# Patient Record
Sex: MALE | Race: WHITE | NOT HISPANIC OR LATINO | Employment: OTHER | URBAN - METROPOLITAN AREA
[De-identification: names, ages, dates, MRNs, and addresses within clinical notes are randomized per-mention and may not be internally consistent; named-entity substitution may affect disease eponyms.]

---

## 2017-02-08 ENCOUNTER — PATIENT MESSAGE (OUTPATIENT)
Dept: INTERNAL MEDICINE | Facility: CLINIC | Age: 57
End: 2017-02-08

## 2017-10-04 DIAGNOSIS — R06.00 DYSPNEA, UNSPECIFIED TYPE: Primary | ICD-10-CM

## 2017-10-18 ENCOUNTER — HOSPITAL ENCOUNTER (OUTPATIENT)
Dept: RADIOLOGY | Facility: HOSPITAL | Age: 57
Discharge: HOME | End: 2017-10-18
Payer: MEDICARE

## 2017-10-18 ENCOUNTER — OFFICE VISIT (OUTPATIENT)
Dept: PULMONOLOGY | Facility: CLINIC | Age: 57
End: 2017-10-18
Payer: COMMERCIAL

## 2017-10-18 ENCOUNTER — CLINICAL SUPPORT (OUTPATIENT)
Dept: INTERNAL MEDICINE | Facility: CLINIC | Age: 57
End: 2017-10-18
Payer: MEDICARE

## 2017-10-18 ENCOUNTER — HOSPITAL ENCOUNTER (OUTPATIENT)
Dept: CARDIOLOGY | Facility: CLINIC | Age: 57
Discharge: HOME | End: 2017-10-18
Payer: MEDICARE

## 2017-10-18 VITALS
HEIGHT: 67 IN | HEART RATE: 44 BPM | SYSTOLIC BLOOD PRESSURE: 98 MMHG | BODY MASS INDEX: 26.94 KG/M2 | DIASTOLIC BLOOD PRESSURE: 60 MMHG | WEIGHT: 171.63 LBS

## 2017-10-18 DIAGNOSIS — Z72.89 OTHER PROBLEMS RELATED TO LIFESTYLE: ICD-10-CM

## 2017-10-18 DIAGNOSIS — E78.5 HYPERLIPIDEMIA, UNSPECIFIED HYPERLIPIDEMIA TYPE: Primary | ICD-10-CM

## 2017-10-18 DIAGNOSIS — E03.9 PRIMARY HYPOTHYROIDISM: ICD-10-CM

## 2017-10-18 DIAGNOSIS — D64.9 ANEMIA, UNSPECIFIED TYPE: ICD-10-CM

## 2017-10-18 DIAGNOSIS — E11.9 DIABETES MELLITUS WITHOUT COMPLICATION: ICD-10-CM

## 2017-10-18 DIAGNOSIS — Z12.5 SPECIAL SCREENING FOR MALIGNANT NEOPLASM OF PROSTATE: ICD-10-CM

## 2017-10-18 DIAGNOSIS — E03.9 MYXEDEMA HEART DISEASE: ICD-10-CM

## 2017-10-18 DIAGNOSIS — I51.9 MYXEDEMA HEART DISEASE: ICD-10-CM

## 2017-10-18 DIAGNOSIS — Z11.4 SCREENING FOR HUMAN IMMUNODEFICIENCY VIRUS: ICD-10-CM

## 2017-10-18 DIAGNOSIS — R06.00 DYSPNEA, UNSPECIFIED TYPE: ICD-10-CM

## 2017-10-18 DIAGNOSIS — Z00.00 ANNUAL PHYSICAL EXAM: Primary | ICD-10-CM

## 2017-10-18 LAB
ALBUMIN SERPL BCP-MCNC: 3.7 G/DL
ALP SERPL-CCNC: 74 U/L
ALT SERPL W/O P-5'-P-CCNC: 34 U/L
ANION GAP SERPL CALC-SCNC: 10 MMOL/L
AST SERPL-CCNC: 44 U/L
BILIRUB SERPL-MCNC: 1.3 MG/DL
BUN SERPL-MCNC: 19 MG/DL
CALCIUM SERPL-MCNC: 9.7 MG/DL
CHLORIDE SERPL-SCNC: 110 MMOL/L
CHOLEST SERPL-MCNC: 154 MG/DL
CHOLEST/HDLC SERPL: 3.1 {RATIO}
CO2 SERPL-SCNC: 28 MMOL/L
COMPLEXED PSA SERPL-MCNC: 0.43 NG/ML
CREAT SERPL-MCNC: 1 MG/DL
ERYTHROCYTE [DISTWIDTH] IN BLOOD BY AUTOMATED COUNT: 13.2 %
EST. GFR  (AFRICAN AMERICAN): >60 ML/MIN/1.73 M^2
EST. GFR  (NON AFRICAN AMERICAN): >60 ML/MIN/1.73 M^2
ESTIMATED AVG GLUCOSE: 120 MG/DL
GLUCOSE SERPL-MCNC: 122 MG/DL
HBA1C MFR BLD HPLC: 5.8 %
HCT VFR BLD AUTO: 42.8 %
HCV AB SERPL QL IA: NEGATIVE
HDLC SERPL-MCNC: 49 MG/DL
HDLC SERPL: 31.8 %
HGB BLD-MCNC: 13.9 G/DL
HIV 1+2 AB+HIV1 P24 AG SERPL QL IA: NEGATIVE
LDLC SERPL CALC-MCNC: 91.8 MG/DL
MCH RBC QN AUTO: 31.1 PG
MCHC RBC AUTO-ENTMCNC: 32.5 G/DL
MCV RBC AUTO: 96 FL
NONHDLC SERPL-MCNC: 105 MG/DL
PLATELET # BLD AUTO: 198 K/UL
PMV BLD AUTO: 10.5 FL
POTASSIUM SERPL-SCNC: 4.8 MMOL/L
PROT SERPL-MCNC: 7.1 G/DL
RBC # BLD AUTO: 4.47 M/UL
SODIUM SERPL-SCNC: 148 MMOL/L
TRIGL SERPL-MCNC: 66 MG/DL
TSH SERPL DL<=0.005 MIU/L-ACNC: 3.68 UIU/ML
WBC # BLD AUTO: 5.26 K/UL

## 2017-10-18 PROCEDURE — 85027 COMPLETE CBC AUTOMATED: CPT

## 2017-10-18 PROCEDURE — 86803 HEPATITIS C AB TEST: CPT

## 2017-10-18 PROCEDURE — 99999 PR PBB SHADOW E&M-EST. PATIENT-LVL III: CPT | Mod: PBBFAC,,, | Performed by: INTERNAL MEDICINE

## 2017-10-18 PROCEDURE — 99396 PREV VISIT EST AGE 40-64: CPT | Mod: S$GLB,,, | Performed by: INTERNAL MEDICINE

## 2017-10-18 PROCEDURE — 71020 XR CHEST PA AND LATERAL: CPT | Mod: 26,,, | Performed by: RADIOLOGY

## 2017-10-18 PROCEDURE — 83036 HEMOGLOBIN GLYCOSYLATED A1C: CPT

## 2017-10-18 PROCEDURE — 80061 LIPID PANEL: CPT

## 2017-10-18 PROCEDURE — 86703 HIV-1/HIV-2 1 RESULT ANTBDY: CPT

## 2017-10-18 PROCEDURE — 84443 ASSAY THYROID STIM HORMONE: CPT

## 2017-10-18 PROCEDURE — 71020 XR CHEST PA AND LATERAL: CPT | Mod: TC

## 2017-10-18 PROCEDURE — 93005 ELECTROCARDIOGRAM TRACING: CPT | Mod: PBBFAC | Performed by: INTERNAL MEDICINE

## 2017-10-18 PROCEDURE — 84153 ASSAY OF PSA TOTAL: CPT

## 2017-10-18 PROCEDURE — 93010 ELECTROCARDIOGRAM REPORT: CPT | Mod: S$PBB,,, | Performed by: INTERNAL MEDICINE

## 2017-10-18 PROCEDURE — 80053 COMPREHEN METABOLIC PANEL: CPT

## 2017-10-18 NOTE — LETTER
October 18, 2017    Yoel Martinez  [address] [facility] [address]             [facility] - Pulmonary Services  [facility]  [place] [address]  Phone: [phone] Dear Mr. Martinez:    Thank you for allowing me to serve you and perform your Executive Health exam on 10/18/2017. This letter will serve as a brief summary of the physical findings and laboratory/studies performed and recommendations at this time. At today's visit, you have a slight elevation of your blood sugar, so I would ask you to restrict your sweet intake. All other parameters are normal.         If you have any questions or concerns, please don't hesitate to call.    Sincerely,        Jay Lee MD

## 2017-10-18 NOTE — PROGRESS NOTES
Subjective:       Patient ID: Yoel Martinez is a 57 y.o. male.    Chief Complaint: Annual Exam    HPI  56 yo retired [employer] employee comes for his periodic health exam. He has very limited vision secondary to Stargardt's Disease, a form of macular degeneration. He told medical prison and has limited vision but has adapters for his glasses that allows him to read and drive in the day time. 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 He has no active medical complaints and is in very good spirits.  He had two cardiac stents place several years ago after a positive stress test. He has no symptoms today. He did a stress EKG last year and achieved 21 METS! And got a Mayorga Score of 11.   Review of Systems   Constitutional: Negative.    HENT: Negative.    Eyes: Negative.         Decreased vision secondary to Stargardt's disease   Respiratory: Negative.         Peak flow 650 l/min   Cardiovascular: Negative.         2  Stents:  November 2014. Asymptomatic now.   Gastrointestinal: Negative.    Genitourinary: Negative.    Musculoskeletal: Negative.    Skin: Negative.    Neurological: Negative.    Psychiatric/Behavioral: Negative.    All other systems reviewed and are negative.      Objective:      Physical Exam    Assessment:       No diagnosis found.    Plan:           Labs: Glucose: 122 and HgB A-1C 5.8--Borderline diabetic, All other parameters are normal. Chest x-ray is clear and EKG: sinus bradycardia, otherwise normal.

## 2018-03-14 DIAGNOSIS — E78.5 HYPERLIPIDEMIA, UNSPECIFIED HYPERLIPIDEMIA TYPE: ICD-10-CM

## 2018-03-14 DIAGNOSIS — Z98.61 S/P PTCA (PERCUTANEOUS TRANSLUMINAL CORONARY ANGIOPLASTY): ICD-10-CM

## 2018-03-14 RX ORDER — ATORVASTATIN CALCIUM 80 MG/1
80 TABLET, FILM COATED ORAL DAILY
Qty: 90 TABLET | Refills: 3 | Status: SHIPPED | OUTPATIENT
Start: 2018-03-14 | End: 2018-12-07 | Stop reason: SDUPTHER

## 2018-05-09 ENCOUNTER — OFFICE VISIT (OUTPATIENT)
Dept: CARDIOLOGY | Facility: CLINIC | Age: 58
End: 2018-05-09
Payer: COMMERCIAL

## 2018-05-09 VITALS
BODY MASS INDEX: 28.2 KG/M2 | HEART RATE: 42 BPM | HEIGHT: 67 IN | WEIGHT: 179.69 LBS | DIASTOLIC BLOOD PRESSURE: 76 MMHG | SYSTOLIC BLOOD PRESSURE: 133 MMHG

## 2018-05-09 DIAGNOSIS — Z98.61 S/P PTCA (PERCUTANEOUS TRANSLUMINAL CORONARY ANGIOPLASTY): Primary | ICD-10-CM

## 2018-05-09 DIAGNOSIS — E78.5 HYPERLIPIDEMIA, UNSPECIFIED HYPERLIPIDEMIA TYPE: ICD-10-CM

## 2018-05-09 DIAGNOSIS — Z82.49 FAMILY HISTORY OF EARLY CAD: ICD-10-CM

## 2018-05-09 PROCEDURE — 99213 OFFICE O/P EST LOW 20 MIN: CPT | Mod: S$GLB,,, | Performed by: INTERNAL MEDICINE

## 2018-05-09 PROCEDURE — 99999 PR PBB SHADOW E&M-EST. PATIENT-LVL III: CPT | Mod: PBBFAC,,, | Performed by: INTERNAL MEDICINE

## 2018-05-09 RX ORDER — EZETIMIBE 10 MG/1
10 TABLET ORAL DAILY
Qty: 90 TABLET | Refills: 3 | Status: SHIPPED | OUTPATIENT
Start: 2018-05-09 | End: 2018-12-07 | Stop reason: SDUPTHER

## 2018-05-09 RX ORDER — GLUCOSAMINE/CHONDRO SU A 500-400 MG
1 TABLET ORAL DAILY
COMMUNITY
End: 2020-01-22

## 2018-05-09 NOTE — Clinical Note
Thank you for referring Yoel Michelle for follow-up of coronary artery disease. Please see my note for details of this encounter. If you have any questions, please contact me.  Thank you again for the referral.

## 2018-05-09 NOTE — PROGRESS NOTES
Chart has been dictated using voice recognition software.  It is not been reviewed carefully for any transcriptional errors due to this technology.   Subjective:   Patient ID:  Yoel Martinez is a 57 y.o. male who presents for follow-up of S/P PTCA (percutaneous transluminal coronary angioplasty) (1 yr f/u )      HPI: Patient with asymptomatic coronary artery disease s/p PCI on 24-Nov-2014 with a design stents placed to proximal LAD and mid RCA presents for reevaluation.  .    Patient denies any chest discomfort on exertion or at rest.  Patient denies any dyspnea at rest or on exertion, orthopnea, PND, or edema.  Patient denies any palpitations, lightheadedness, or syncope.      Past Medical History:   Diagnosis Date    Coronary artery disease     Hyperlipidemia     Macular degeneration     legally blind    Stargardt's disease        Outpatient Medications Prior to Visit   Medication Sig Dispense Refill    aspirin (ECOTRIN) 81 MG EC tablet Take 81 mg by mouth once daily.      atorvastatin (LIPITOR) 80 MG tablet Take 1 tablet (80 mg total) by mouth once daily. 90 tablet 3    cholecalciferol, vitamin D3, (VITAMIN D3) 2,000 unit Cap Take 1 capsule by mouth once daily.      omega-3 fatty acids-vitamin E (FISH OIL) 1,000 mg Cap Take by mouth.      saw palmetto 160 MG capsule Take 450 mg by mouth once daily.       ezetimibe (ZETIA) 10 mg tablet Take 1 tablet (10 mg total) by mouth once daily. 90 tablet 3     No facility-administered medications prior to visit.        ROS - No change from prior visit in neurologic, respiratory, endocrine, GI, hematologic, or constitutional complaints   Objective:   Physical Exam      Lab Results   Component Value Date     (H) 10/18/2017    K 4.8 10/18/2017    BUN 19 10/18/2017    CREATININE 1.0 10/18/2017     (H) 10/18/2017    HGBA1C 5.8 (H) 10/18/2017    CHOL 154 10/18/2017    HDL 49 10/18/2017    LDLCALC 91.8 10/18/2017    TRIG 66 10/18/2017    CHOLHDL 31.8  10/18/2017    HGB 13.9 (L) 10/18/2017    HCT 42.8 10/18/2017     10/18/2017    INR 0.9 11/25/2014       Assessment:     1. S/P PTCA (percutaneous transluminal coronary angioplasty)    2. Hyperlipidemia, unspecified hyperlipidemia type    3. Family history of early CAD      Patient has no symptoms of cardiac ischemia, heart failure, or significant arrhythmias.  The patient needs better control of his lipid profile as his last LDL cholesterol was 90.  The patient has agreed to retry ezetimibe now that it is generic.  Patient will have a lipid profile checked 4 weeks after starting the therapy.  Patient should return as needed, as other than his cholesterol, all of his other parameters are controlled.  Plan:     Yoel was seen today for s/p ptca (percutaneous transluminal coronary angioplasty).    Diagnoses and all orders for this visit:    S/P PTCA (percutaneous transluminal coronary angioplasty)  -     ezetimibe (ZETIA) 10 mg tablet; Take 1 tablet (10 mg total) by mouth once daily.    Hyperlipidemia, unspecified hyperlipidemia type  -     ezetimibe (ZETIA) 10 mg tablet; Take 1 tablet (10 mg total) by mouth once daily.    Family history of early CAD    Other orders  -     glucosamine-chondroitin 500-400 mg tablet; Take 1 tablet by mouth once daily.          Rui Rodríguez MD  Consultative Cardiology

## 2018-05-17 ENCOUNTER — PATIENT MESSAGE (OUTPATIENT)
Dept: CARDIOLOGY | Facility: CLINIC | Age: 58
End: 2018-05-17

## 2018-05-18 DIAGNOSIS — E78.5 HYPERLIPIDEMIA, UNSPECIFIED HYPERLIPIDEMIA TYPE: Primary | ICD-10-CM

## 2018-11-02 DIAGNOSIS — Z00.00 ROUTINE GENERAL MEDICAL EXAMINATION AT A HEALTH CARE FACILITY: Primary | ICD-10-CM

## 2018-12-07 ENCOUNTER — OFFICE VISIT (OUTPATIENT)
Dept: PULMONOLOGY | Facility: CLINIC | Age: 58
End: 2018-12-07
Payer: MEDICARE

## 2018-12-07 ENCOUNTER — CLINICAL SUPPORT (OUTPATIENT)
Dept: INTERNAL MEDICINE | Facility: CLINIC | Age: 58
End: 2018-12-07
Payer: MEDICARE

## 2018-12-07 ENCOUNTER — HOSPITAL ENCOUNTER (OUTPATIENT)
Dept: CARDIOLOGY | Facility: CLINIC | Age: 58
Discharge: HOME | End: 2018-12-07
Payer: MEDICARE

## 2018-12-07 ENCOUNTER — OFFICE VISIT (OUTPATIENT)
Dept: CARDIOLOGY | Facility: CLINIC | Age: 58
End: 2018-12-07
Payer: MEDICARE

## 2018-12-07 VITALS
SYSTOLIC BLOOD PRESSURE: 134 MMHG | HEART RATE: 45 BPM | HEIGHT: 67 IN | DIASTOLIC BLOOD PRESSURE: 63 MMHG | BODY MASS INDEX: 30.04 KG/M2 | WEIGHT: 191.38 LBS

## 2018-12-07 VITALS
HEART RATE: 44 BPM | DIASTOLIC BLOOD PRESSURE: 74 MMHG | BODY MASS INDEX: 29.35 KG/M2 | RESPIRATION RATE: 12 BRPM | WEIGHT: 187 LBS | SYSTOLIC BLOOD PRESSURE: 147 MMHG | HEIGHT: 67 IN

## 2018-12-07 DIAGNOSIS — H35.30 MACULAR DEGENERATION, UNSPECIFIED LATERALITY, UNSPECIFIED TYPE: ICD-10-CM

## 2018-12-07 DIAGNOSIS — Z98.61 S/P PTCA (PERCUTANEOUS TRANSLUMINAL CORONARY ANGIOPLASTY): Primary | ICD-10-CM

## 2018-12-07 DIAGNOSIS — E78.5 HYPERLIPIDEMIA, UNSPECIFIED HYPERLIPIDEMIA TYPE: ICD-10-CM

## 2018-12-07 DIAGNOSIS — Z00.00 ANNUAL PHYSICAL EXAM: Primary | ICD-10-CM

## 2018-12-07 DIAGNOSIS — Z00.00 ROUTINE GENERAL MEDICAL EXAMINATION AT A HEALTH CARE FACILITY: ICD-10-CM

## 2018-12-07 DIAGNOSIS — Z00.00 ROUTINE GENERAL MEDICAL EXAMINATION AT A HEALTH CARE FACILITY: Primary | ICD-10-CM

## 2018-12-07 DIAGNOSIS — R04.0 EPISTAXIS: ICD-10-CM

## 2018-12-07 LAB
ALBUMIN SERPL BCP-MCNC: 3.9 G/DL
ALP SERPL-CCNC: 70 U/L
ALT SERPL W/O P-5'-P-CCNC: 43 U/L
ANION GAP SERPL CALC-SCNC: 8 MMOL/L
AST SERPL-CCNC: 39 U/L
BILIRUB SERPL-MCNC: 1.4 MG/DL
BUN SERPL-MCNC: 18 MG/DL
CALCIUM SERPL-MCNC: 9.2 MG/DL
CHLORIDE SERPL-SCNC: 108 MMOL/L
CHOLEST SERPL-MCNC: 131 MG/DL
CHOLEST/HDLC SERPL: 2.7 {RATIO}
CO2 SERPL-SCNC: 28 MMOL/L
COMPLEXED PSA SERPL-MCNC: 0.34 NG/ML
CREAT SERPL-MCNC: 0.9 MG/DL
ERYTHROCYTE [DISTWIDTH] IN BLOOD BY AUTOMATED COUNT: 13.2 %
EST. GFR  (AFRICAN AMERICAN): >60 ML/MIN/1.73 M^2
EST. GFR  (NON AFRICAN AMERICAN): >60 ML/MIN/1.73 M^2
ESTIMATED AVG GLUCOSE: 120 MG/DL
GLUCOSE SERPL-MCNC: 112 MG/DL
HBA1C MFR BLD HPLC: 5.8 %
HCT VFR BLD AUTO: 44 %
HDLC SERPL-MCNC: 49 MG/DL
HDLC SERPL: 37.4 %
HGB BLD-MCNC: 13.9 G/DL
LDLC SERPL CALC-MCNC: 56.6 MG/DL
MCH RBC QN AUTO: 30.7 PG
MCHC RBC AUTO-ENTMCNC: 31.6 G/DL
MCV RBC AUTO: 97 FL
NONHDLC SERPL-MCNC: 82 MG/DL
PLATELET # BLD AUTO: 197 K/UL
PMV BLD AUTO: 10.6 FL
POTASSIUM SERPL-SCNC: 4.8 MMOL/L
PROT SERPL-MCNC: 7.1 G/DL
RBC # BLD AUTO: 4.53 M/UL
SODIUM SERPL-SCNC: 144 MMOL/L
T4 FREE SERPL-MCNC: 0.78 NG/DL
TRIGL SERPL-MCNC: 127 MG/DL
TSH SERPL DL<=0.005 MIU/L-ACNC: 4.37 UIU/ML
WBC # BLD AUTO: 5.8 K/UL

## 2018-12-07 PROCEDURE — 93005 ELECTROCARDIOGRAM TRACING: CPT | Mod: PBBFAC | Performed by: INTERNAL MEDICINE

## 2018-12-07 PROCEDURE — 83036 HEMOGLOBIN GLYCOSYLATED A1C: CPT

## 2018-12-07 PROCEDURE — 93010 ELECTROCARDIOGRAM REPORT: CPT | Mod: S$PBB,,, | Performed by: INTERNAL MEDICINE

## 2018-12-07 PROCEDURE — 84153 ASSAY OF PSA TOTAL: CPT

## 2018-12-07 PROCEDURE — 99213 OFFICE O/P EST LOW 20 MIN: CPT | Mod: PBBFAC,25 | Performed by: INTERNAL MEDICINE

## 2018-12-07 PROCEDURE — 80061 LIPID PANEL: CPT

## 2018-12-07 PROCEDURE — 84439 ASSAY OF FREE THYROXINE: CPT

## 2018-12-07 PROCEDURE — 99213 OFFICE O/P EST LOW 20 MIN: CPT | Mod: S$PBB,,, | Performed by: INTERNAL MEDICINE

## 2018-12-07 PROCEDURE — 85027 COMPLETE CBC AUTOMATED: CPT

## 2018-12-07 PROCEDURE — 99396 PREV VISIT EST AGE 40-64: CPT | Mod: S$GLB,,, | Performed by: INTERNAL MEDICINE

## 2018-12-07 PROCEDURE — 84443 ASSAY THYROID STIM HORMONE: CPT

## 2018-12-07 PROCEDURE — 99999 PR PBB SHADOW E&M-EST. PATIENT-LVL II: CPT | Mod: PBBFAC,,, | Performed by: INTERNAL MEDICINE

## 2018-12-07 PROCEDURE — 99999 PR PBB SHADOW E&M-EST. PATIENT-LVL III: CPT | Mod: PBBFAC,,, | Performed by: INTERNAL MEDICINE

## 2018-12-07 PROCEDURE — 80053 COMPREHEN METABOLIC PANEL: CPT

## 2018-12-07 RX ORDER — EZETIMIBE 10 MG/1
10 TABLET ORAL DAILY
Qty: 90 TABLET | Refills: 3 | Status: SHIPPED | OUTPATIENT
Start: 2018-12-07 | End: 2020-01-22 | Stop reason: SDUPTHER

## 2018-12-07 RX ORDER — ATORVASTATIN CALCIUM 80 MG/1
80 TABLET, FILM COATED ORAL DAILY
Qty: 90 TABLET | Refills: 3 | Status: SHIPPED | OUTPATIENT
Start: 2018-12-07 | End: 2019-12-09 | Stop reason: SDUPTHER

## 2018-12-07 NOTE — PROGRESS NOTES
Chart has been dictated using voice recognition software.  It is not been reviewed carefully for any transcriptional errors due to this technology.   Subjective:   Patient ID:  Yoel Martinez is a 58 y.o. male who presents for evaluation of S/P PTCA (percutaneous transluminal coronary angioplasty) (6 month f/u )      HPI: Patient with asymptomatic coronary artery disease s/p PCI on 24-Nov-2014 with drug eluting with stents placed to proximal LAD and mid RCA presents for reevaluation.      Patient denies any chest discomfort on exertion or at rest.  Patient denies any dyspnea at rest or on exertion, orthopnea, PND, or edema.  Patient denies any palpitations, lightheadedness, or syncope.       Past Medical History:   Diagnosis Date    Coronary artery disease     Hyperlipidemia     Macular degeneration     legally blind    Stargardt's disease        Past Surgical History:   Procedure Laterality Date    CARDIAC CATHETERIZATION  11/25/14    Successful 2vs JACKSON for LAD and RCA disease    HEART CATH-LEFT N/A 11/25/2014    Performed by Moris Raza MD at [facility] C[facility]    Left thigh tumor      TONSILLECTOMY         Social History     Tobacco Use    Smoking status: Passive Smoke Exposure - Never Smoker    Smokeless tobacco: Never Used    Tobacco comment: The patient is disabled secondary to his legal blindness.  He does exercise readily about 3 times per week.   Substance Use Topics    Alcohol use: Yes     Comment: The patient drinks about 2 beers per day.  He has taken up to a max of 18 beers per day    Drug use: No       Outpatient Medications Prior to Visit   Medication Sig Dispense Refill    aspirin (ECOTRIN) 81 MG EC tablet Take 81 mg by mouth once daily.      cholecalciferol, vitamin D3, (VITAMIN D3) 2,000 unit Cap Take 1 capsule by mouth once daily.      glucosamine-chondroitin 500-400 mg tablet Take 1 tablet by mouth once daily.      omega-3 fatty acids-vitamin E (FISH OIL) 1,000 mg Cap Take by  "mouth.      saw palmetto 160 MG capsule Take 450 mg by mouth once daily.       atorvastatin (LIPITOR) 80 MG tablet Take 1 tablet (80 mg total) by mouth once daily. 90 tablet 3    ezetimibe (ZETIA) 10 mg tablet Take 1 tablet (10 mg total) by mouth once daily. 90 tablet 3     No facility-administered medications prior to visit.        Review of patient's allergies indicates:   Allergen Reactions    Penicillins Rash       ROS - No change from prior visit in neurologic, respiratory, endocrine, GI, hematologic, or constitutional complaints.  Patient notes that he will have nose bleeds easily controllable with approximately 2-3 times per week.  He is not sure if they are seasonal variation in these nosebleeds.    Objective:   Physical Exam   Constitutional: He is oriented to person, place, and time. He appears well-developed and well-nourished.   /63 (BP Location: Left arm, Patient Position: Sitting, BP Method: Large (Automatic))   Pulse (!) 45   Ht 5' 7" (1.702 m)   Wt 86.8 kg (191 lb 5.8 oz)   BMI 29.97 kg/m²      Neck: Neck supple. No JVD present. Carotid bruit is not present.   Cardiovascular: Normal rate, regular rhythm, normal heart sounds and intact distal pulses. Exam reveals no gallop and no friction rub.   No murmur heard.  Pulmonary/Chest: Effort normal and breath sounds normal. He has no wheezes. He has no rales.   Abdominal: Soft. Bowel sounds are normal. He exhibits no abdominal bruit. There is no hepatomegaly. There is no tenderness.   Musculoskeletal: He exhibits no edema.   Neurological: He is alert and oriented to person, place, and time. He has normal strength.   Skin: No cyanosis. Nails show no clubbing.       Lab Results   Component Value Date    WBC 5.80 12/07/2018    HGB 13.9 (L) 12/07/2018    HCT 44.0 12/07/2018    MCV 97 12/07/2018     12/07/2018       Lab Results   Component Value Date     12/07/2018    K 4.8 12/07/2018    BUN 18 12/07/2018    CREATININE 0.9 12/07/2018 "     (H) 12/07/2018    HGBA1C 5.8 (H) 12/07/2018    CHOL 131 12/07/2018    HDL 49 12/07/2018    LDLCALC 56.6 (L) 12/07/2018    TRIG 127 12/07/2018    CHOLHDL 37.4 12/07/2018    HGB 13.9 (L) 12/07/2018    HCT 44.0 12/07/2018     12/07/2018    INR 0.9 11/25/2014       Assessment:     1. S/P PTCA (percutaneous transluminal coronary angioplasty)    2. Hyperlipidemia, unspecified hyperlipidemia type    3. Macular degeneration, unspecified laterality, unspecified type    4. Epistaxis      Patient has no symptoms of cardiac ischemia, heart failure, or significant arrhythmias.  The patient's cholesterol has responded well to the addition of ezetimibe with his LDL cholesterol now being around 57.  Patient's nose bleeds may be due to the aspirin or combination of aspirin and fish oil.  There also may be some problem that could be easily relieved.  The patient will start tracking the frequency and seasonal variation is nose bleeds.  It is not clear that there is any role in the patient's care for the use of official he is taking in the dose that he is taking.  Therefore, if the nosebleeds continue, the patient will stop the official tablets to see if it makes a difference.  If it does not, the patient will may be referred to ENT for further evaluation.  Unless there are any significant problems, the patient should return in 1 year for re-evaluation.   Plan:     Yoel was seen today for s/p ptca (percutaneous transluminal coronary angioplasty).    Diagnoses and all orders for this visit:    S/P PTCA (percutaneous transluminal coronary angioplasty)  -     atorvastatin (LIPITOR) 80 MG tablet; Take 1 tablet (80 mg total) by mouth once daily.  -     ezetimibe (ZETIA) 10 mg tablet; Take 1 tablet (10 mg total) by mouth once daily.    Hyperlipidemia, unspecified hyperlipidemia type  -     atorvastatin (LIPITOR) 80 MG tablet; Take 1 tablet (80 mg total) by mouth once daily.  -     ezetimibe (ZETIA) 10 mg tablet; Take 1  tablet (10 mg total) by mouth once daily.    Macular degeneration, unspecified laterality, unspecified type    Epistaxis          Rui Rodríguez MD  Consultative Cardiology

## 2018-12-07 NOTE — LETTER
December 7, 2018    Yoel Martinez  [address] [facility] [address]             [facility] - Pulmonary Services  [address]  [place] [address]  Phone: [phone] Dear Mr. Martinez:    Thank you for allowing me to serve you and perform your Executive Health exam on 12/7/2018. This letter will serve as a brief summary of the physical findings and laboratory/studies performed and recommendations at this time. Except for a borderline elevation of the blood sugar and your macular degeneration, this is a normal exam. Enjoy the Holidays.        If you have any questions or concerns, please don't hesitate to call.    Sincerely,        Jay Lee MD

## 2018-12-07 NOTE — Clinical Note
Thank you for referring Yoel Martinez for follow-up of coronary artery disease and hyperlipidemia. Please see my note for details of this encounter. If you have any questions, please contact me.  Thank you again for the referral.

## 2018-12-07 NOTE — PROGRESS NOTES
Subjective:       Patient ID: Yoel Martinez is a 58 y.o. male.    Chief Complaint: No chief complaint on file.    HPI  59 yo [employer] retired employee comes for his periodic health exam. He feels well, has severe macular degeneration (Stargardt's Syndrome) so had to retire early. He exercise 3 hours multiple days a week to stay active. He saw Dr. Rodríguez earlier today to follow up on his CAD. She was found to have asymtpomatic CAD on routine stress test and had to have two stents. Last year. Had a superb performance on treadmill. No complaints today.  Review of Systems   Constitutional: Negative.    HENT: Negative.    Eyes: Negative.         Severe macular degeneration   Respiratory: Negative.    Cardiovascular: Negative.         CAD had two stents placed in 2014   Gastrointestinal: Negative.    Genitourinary: Negative.    Musculoskeletal: Negative.    Skin: Negative.    Neurological: Negative.    Psychiatric/Behavioral: Negative.    All other systems reviewed and are negative.      Objective:      Physical Exam   Constitutional: He is oriented to person, place, and time. He appears well-developed and well-nourished.   HENT:   Head: Normocephalic and atraumatic.   Right Ear: External ear normal.   Left Ear: External ear normal.   Eyes: Conjunctivae and EOM are normal. Pupils are equal, round, and reactive to light.   Neck: Normal range of motion. Neck supple.   Cardiovascular: Normal rate, regular rhythm and normal heart sounds.   Pulmonary/Chest: Effort normal and breath sounds normal.   Peak flow 650 l/min   Abdominal: Soft. Bowel sounds are normal.   Musculoskeletal: Normal range of motion.   Neurological: He is alert and oriented to person, place, and time. He has normal reflexes.   Skin: Skin is warm and dry.   Psychiatric: He has a normal mood and affect. His behavior is normal. Judgment and thought content normal.       Assessment:       1. Annual physical exam        Plan:           EKG is normal. Labs:  Glucose: 112, HgB A1-C: 5.8. TSH slightly elevated but normal T-4. IMP Silent CAD and Macular Degeneration will address glucose with diet management.

## 2019-12-09 DIAGNOSIS — E78.5 HYPERLIPIDEMIA, UNSPECIFIED HYPERLIPIDEMIA TYPE: ICD-10-CM

## 2019-12-09 DIAGNOSIS — Z98.61 S/P PTCA (PERCUTANEOUS TRANSLUMINAL CORONARY ANGIOPLASTY): ICD-10-CM

## 2019-12-09 RX ORDER — ATORVASTATIN CALCIUM 80 MG/1
80 TABLET, FILM COATED ORAL DAILY
Qty: 90 TABLET | Refills: 0 | Status: SHIPPED | OUTPATIENT
Start: 2019-12-09 | End: 2020-01-22 | Stop reason: SDUPTHER

## 2019-12-09 NOTE — TELEPHONE ENCOUNTER
----- Message from Bridgette Salinas MA sent at 12/9/2019  2:17 PM CST -----  Contact: self  Pt is calling to get a refill for Lipitor 80 mg to [facility] [phone],# 30 day supply. Marcos Nuñez pt. Last visit 12/7/18. Pt will not let me schedule an appt.  with   now. Has 3 pills left. He wants to schedule his PCP and  appt together same day,he lives out of town. Please call [phone].

## 2019-12-11 DIAGNOSIS — R06.00 DYSPNEA, UNSPECIFIED TYPE: Primary | ICD-10-CM

## 2020-01-22 ENCOUNTER — OFFICE VISIT (OUTPATIENT)
Dept: CARDIOLOGY | Facility: CLINIC | Age: 60
End: 2020-01-22
Payer: MEDICARE

## 2020-01-22 ENCOUNTER — CLINICAL SUPPORT (OUTPATIENT)
Dept: INFECTIOUS DISEASES | Facility: CLINIC | Age: 60
End: 2020-01-22
Payer: MEDICARE

## 2020-01-22 ENCOUNTER — HOSPITAL ENCOUNTER (OUTPATIENT)
Dept: CARDIOLOGY | Facility: CLINIC | Age: 60
Discharge: HOME | End: 2020-01-22
Payer: MEDICARE

## 2020-01-22 ENCOUNTER — CLINICAL SUPPORT (OUTPATIENT)
Dept: INTERNAL MEDICINE | Facility: CLINIC | Age: 60
End: 2020-01-22
Payer: MEDICARE

## 2020-01-22 ENCOUNTER — HOSPITAL ENCOUNTER (OUTPATIENT)
Dept: RADIOLOGY | Facility: HOSPITAL | Age: 60
Discharge: HOME | End: 2020-01-22
Attending: INTERNAL MEDICINE
Payer: MEDICARE

## 2020-01-22 ENCOUNTER — OFFICE VISIT (OUTPATIENT)
Dept: INFECTIOUS DISEASES | Facility: CLINIC | Age: 60
End: 2020-01-22
Payer: COMMERCIAL

## 2020-01-22 VITALS
WEIGHT: 191.81 LBS | SYSTOLIC BLOOD PRESSURE: 136 MMHG | HEIGHT: 67 IN | HEART RATE: 53 BPM | DIASTOLIC BLOOD PRESSURE: 79 MMHG | WEIGHT: 190.25 LBS | BODY MASS INDEX: 30.1 KG/M2 | SYSTOLIC BLOOD PRESSURE: 123 MMHG | HEART RATE: 55 BPM | DIASTOLIC BLOOD PRESSURE: 79 MMHG | TEMPERATURE: 99 F | HEIGHT: 67 IN | BODY MASS INDEX: 29.86 KG/M2

## 2020-01-22 DIAGNOSIS — E78.5 HYPERLIPIDEMIA, UNSPECIFIED HYPERLIPIDEMIA TYPE: ICD-10-CM

## 2020-01-22 DIAGNOSIS — Z00.00 PREVENTATIVE HEALTH CARE: Primary | ICD-10-CM

## 2020-01-22 DIAGNOSIS — Z98.61 S/P PTCA (PERCUTANEOUS TRANSLUMINAL CORONARY ANGIOPLASTY): ICD-10-CM

## 2020-01-22 DIAGNOSIS — Z00.00 PREVENTATIVE HEALTH CARE: ICD-10-CM

## 2020-01-22 DIAGNOSIS — R06.00 DYSPNEA, UNSPECIFIED TYPE: ICD-10-CM

## 2020-01-22 DIAGNOSIS — Z98.61 S/P PTCA (PERCUTANEOUS TRANSLUMINAL CORONARY ANGIOPLASTY): Primary | ICD-10-CM

## 2020-01-22 DIAGNOSIS — Z01.810 PREOPERATIVE CARDIOVASCULAR EXAMINATION: ICD-10-CM

## 2020-01-22 DIAGNOSIS — H35.30 MACULAR DEGENERATION, UNSPECIFIED LATERALITY, UNSPECIFIED TYPE: ICD-10-CM

## 2020-01-22 DIAGNOSIS — S83.206D ACUTE TORN MENISCUS OF KNEE, RIGHT, SUBSEQUENT ENCOUNTER: ICD-10-CM

## 2020-01-22 DIAGNOSIS — Z00.00 ANNUAL PHYSICAL EXAM: Primary | ICD-10-CM

## 2020-01-22 PROBLEM — R04.0 EPISTAXIS: Status: RESOLVED | Noted: 2018-12-07 | Resolved: 2020-01-22

## 2020-01-22 LAB
ALBUMIN SERPL BCP-MCNC: 4.3 G/DL (ref 3.5–5.2)
ALP SERPL-CCNC: 76 U/L (ref 55–135)
ALT SERPL W/O P-5'-P-CCNC: 39 U/L (ref 10–44)
ANION GAP SERPL CALC-SCNC: 9 MMOL/L (ref 8–16)
AST SERPL-CCNC: 39 U/L (ref 10–40)
BASOPHILS # BLD AUTO: 0.06 K/UL (ref 0–0.2)
BASOPHILS NFR BLD: 1.3 % (ref 0–1.9)
BILIRUB SERPL-MCNC: 1.7 MG/DL (ref 0.1–1)
BUN SERPL-MCNC: 15 MG/DL (ref 6–20)
CALCIUM SERPL-MCNC: 9.7 MG/DL (ref 8.7–10.5)
CHLORIDE SERPL-SCNC: 106 MMOL/L (ref 95–110)
CHOLEST SERPL-MCNC: 147 MG/DL (ref 120–199)
CHOLEST/HDLC SERPL: 3 {RATIO} (ref 2–5)
CO2 SERPL-SCNC: 27 MMOL/L (ref 23–29)
COMPLEXED PSA SERPL-MCNC: 0.4 NG/ML (ref 0–4)
CREAT SERPL-MCNC: 0.9 MG/DL (ref 0.5–1.4)
DIFFERENTIAL METHOD: ABNORMAL
EOSINOPHIL # BLD AUTO: 0.1 K/UL (ref 0–0.5)
EOSINOPHIL NFR BLD: 1.9 % (ref 0–8)
ERYTHROCYTE [DISTWIDTH] IN BLOOD BY AUTOMATED COUNT: 13.2 % (ref 11.5–14.5)
EST. GFR  (AFRICAN AMERICAN): >60 ML/MIN/1.73 M^2
EST. GFR  (NON AFRICAN AMERICAN): >60 ML/MIN/1.73 M^2
GLUCOSE SERPL-MCNC: 105 MG/DL (ref 70–110)
HCT VFR BLD AUTO: 47.7 % (ref 40–54)
HDLC SERPL-MCNC: 49 MG/DL (ref 40–75)
HDLC SERPL: 33.3 % (ref 20–50)
HGB BLD-MCNC: 14.8 G/DL (ref 14–18)
IMM GRANULOCYTES # BLD AUTO: 0.01 K/UL (ref 0–0.04)
IMM GRANULOCYTES NFR BLD AUTO: 0.2 % (ref 0–0.5)
LDLC SERPL CALC-MCNC: 79.8 MG/DL (ref 63–159)
LYMPHOCYTES # BLD AUTO: 1.3 K/UL (ref 1–4.8)
LYMPHOCYTES NFR BLD: 26.8 % (ref 18–48)
MCH RBC QN AUTO: 30.8 PG (ref 27–31)
MCHC RBC AUTO-ENTMCNC: 31 G/DL (ref 32–36)
MCV RBC AUTO: 99 FL (ref 82–98)
MONOCYTES # BLD AUTO: 0.5 K/UL (ref 0.3–1)
MONOCYTES NFR BLD: 9.5 % (ref 4–15)
NEUTROPHILS # BLD AUTO: 2.9 K/UL (ref 1.8–7.7)
NEUTROPHILS NFR BLD: 60.3 % (ref 38–73)
NONHDLC SERPL-MCNC: 98 MG/DL
NRBC BLD-RTO: 0 /100 WBC
PLATELET # BLD AUTO: 180 K/UL (ref 150–350)
PMV BLD AUTO: 10.9 FL (ref 9.2–12.9)
POTASSIUM SERPL-SCNC: 4.6 MMOL/L (ref 3.5–5.1)
PROT SERPL-MCNC: 7.6 G/DL (ref 6–8.4)
RBC # BLD AUTO: 4.8 M/UL (ref 4.6–6.2)
SODIUM SERPL-SCNC: 142 MMOL/L (ref 136–145)
TRIGL SERPL-MCNC: 91 MG/DL (ref 30–150)
WBC # BLD AUTO: 4.73 K/UL (ref 3.9–12.7)

## 2020-01-22 PROCEDURE — 99999 PR PBB SHADOW E&M-EST. PATIENT-LVL III: ICD-10-PCS | Mod: PBBFAC,,, | Performed by: INTERNAL MEDICINE

## 2020-01-22 PROCEDURE — 99999 PR PBB SHADOW E&M-EST. PATIENT-LVL III: CPT | Mod: PBBFAC,,, | Performed by: INTERNAL MEDICINE

## 2020-01-22 PROCEDURE — 85025 COMPLETE CBC W/AUTO DIFF WBC: CPT

## 2020-01-22 PROCEDURE — 71046 X-RAY EXAM CHEST 2 VIEWS: CPT | Mod: TC,FY

## 2020-01-22 PROCEDURE — 93010 ELECTROCARDIOGRAM REPORT: CPT | Mod: S$PBB,,, | Performed by: INTERNAL MEDICINE

## 2020-01-22 PROCEDURE — 93005 ELECTROCARDIOGRAM TRACING: CPT | Mod: PBBFAC | Performed by: INTERNAL MEDICINE

## 2020-01-22 PROCEDURE — 71046 XR CHEST PA AND LATERAL: ICD-10-PCS | Mod: 26,,, | Performed by: RADIOLOGY

## 2020-01-22 PROCEDURE — 93010 EKG 12-LEAD: ICD-10-PCS | Mod: S$PBB,,, | Performed by: INTERNAL MEDICINE

## 2020-01-22 PROCEDURE — 90686 IIV4 VACC NO PRSV 0.5 ML IM: CPT | Mod: PBBFAC

## 2020-01-22 PROCEDURE — 84153 ASSAY OF PSA TOTAL: CPT

## 2020-01-22 PROCEDURE — 99214 PR OFFICE/OUTPT VISIT, EST, LEVL IV, 30-39 MIN: ICD-10-PCS | Mod: S$PBB,,, | Performed by: INTERNAL MEDICINE

## 2020-01-22 PROCEDURE — 99386 PR PREVENTIVE VISIT,NEW,40-64: ICD-10-PCS | Mod: S$GLB,,, | Performed by: INTERNAL MEDICINE

## 2020-01-22 PROCEDURE — 99213 OFFICE O/P EST LOW 20 MIN: CPT | Mod: PBBFAC,25 | Performed by: INTERNAL MEDICINE

## 2020-01-22 PROCEDURE — 80053 COMPREHEN METABOLIC PANEL: CPT

## 2020-01-22 PROCEDURE — 80061 LIPID PANEL: CPT

## 2020-01-22 PROCEDURE — 71046 X-RAY EXAM CHEST 2 VIEWS: CPT | Mod: 26,,, | Performed by: RADIOLOGY

## 2020-01-22 PROCEDURE — 99386 PREV VISIT NEW AGE 40-64: CPT | Mod: S$GLB,,, | Performed by: INTERNAL MEDICINE

## 2020-01-22 PROCEDURE — 99214 OFFICE O/P EST MOD 30 MIN: CPT | Mod: S$PBB,,, | Performed by: INTERNAL MEDICINE

## 2020-01-22 PROCEDURE — 90750 HZV VACC RECOMBINANT IM: CPT | Mod: PBBFAC

## 2020-01-22 RX ORDER — EZETIMIBE 10 MG/1
10 TABLET ORAL DAILY
Qty: 90 TABLET | Refills: 3 | Status: SHIPPED | OUTPATIENT
Start: 2020-01-22 | End: 2021-02-05 | Stop reason: SDUPTHER

## 2020-01-22 RX ORDER — ATORVASTATIN CALCIUM 80 MG/1
80 TABLET, FILM COATED ORAL DAILY
Qty: 90 TABLET | Refills: 3 | Status: SHIPPED | OUTPATIENT
Start: 2020-01-22 | End: 2021-02-05 | Stop reason: SDUPTHER

## 2020-01-22 NOTE — ASSESSMENT & PLAN NOTE
Pt has torn menisci right knee and is schedule for arthroscopic procedure by Dr. Wheeler in [facility]. He is requesting a surgical clearance for this and has paper work for Dr. Rodríguez who is seeing him after me. Because of reduced physical activity related to his injury, he has gained 10 lb.

## 2020-01-22 NOTE — PROGRESS NOTES
Chart has been dictated using voice recognition software.  It is not been reviewed carefully for any transcriptional errors due to this technology.   Subjective:   Patient ID:  Yoel Martinez is a 59 y.o. male who presents for follow-up of S/P PTCA (percutaneous transluminal coronary angioplasty) and Pre-op Exam (right knee surgery)      HPI: Patient with asymptomatic (diagnosed by ETT, but may have had some exertional dyspnea) coronary artery disease s/p PCI on 24-Nov-2014 with drug eluting with stents placed to proximal LAD and mid RCA presents for reevaluation.      Patient denies any chest discomfort on exertion or at rest. Patient denies any dyspnea at rest or on exertion, orthopnea, PND, or edema.  Patient denies any palpitations, lightheadedness, or syncope.     Has a torn meniscus in right knee that needs surgical treatment.  Has put on weight since knee started as he is not exercising      Past Medical History:   Diagnosis Date    Coronary artery disease     Hyperlipidemia     Macular degeneration     legally blind    Stargardt's disease        Outpatient Medications Prior to Visit   Medication Sig Dispense Refill    aspirin (ECOTRIN) 81 MG EC tablet Take 81 mg by mouth once daily.      atorvastatin (LIPITOR) 80 MG tablet Take 1 tablet (80 mg total) by mouth once daily. 90 tablet 0    cholecalciferol, vitamin D3, (VITAMIN D3) 2,000 unit Cap Take 1 capsule by mouth once daily.      omega-3 fatty acids-vitamin E (FISH OIL) 1,000 mg Cap Take by mouth once daily.       saw palmetto 160 MG capsule Take 450 mg by mouth once daily.       ezetimibe (ZETIA) 10 mg tablet Take 1 tablet (10 mg total) by mouth once daily. 90 tablet 3     No facility-administered medications prior to visit.        ROS - No change from prior visit in neurologic, respiratory, endocrine, GI, hematologic, or constitutional complaints   Objective:   Physical Exam      Lab Results   Component Value Date     01/22/2020    K 4.6  01/22/2020    BUN 15 01/22/2020    CREATININE 0.9 01/22/2020     01/22/2020    HGBA1C 5.8 (H) 12/07/2018    CHOL 147 01/22/2020    HDL 49 01/22/2020    LDLCALC 79.8 01/22/2020    TRIG 91 01/22/2020    CHOLHDL 33.3 01/22/2020    HGB 14.8 01/22/2020    HCT 47.7 01/22/2020     01/22/2020    INR 0.9 11/25/2014     ECG showed sinus bradycardia, otherwise it was a normal ECG.   Assessment:     1. S/P PTCA (percutaneous transluminal coronary angioplasty)    2. Preoperative cardiovascular examination    3. Hyperlipidemia, unspecified hyperlipidemia type    4. Macular degeneration, unspecified laterality, unspecified type      Patient has no symptoms of cardiac ischemia, heart failure, or significant arrhythmias.  No further cardiac evaluation or change in his therapy is needed at this time.  The patient states that he will try to lose weight and exercise more once his knee surgery is completed to trying get his cholesterol back down below 70 mg/dl.    His estimated risk with the proposed surgery/procedure for an adverse perioperative cardiac outcome (MI, pulmonary edema, ventricular fibrillation or primary cardiac arrest, and complete heart block) is 0.9% (0.3%-2.1%) and for an adverse 30 day cardiac outcome (myocardial infarction, cardiac arrest, or death) is 6.0% (95% CI 4.9%-7.4%) (Revised Cardiac Risk Index [RCRI] Score = 1 - CCS Criteria - Can J Cardiol 2017; 33:17-32) based on the following risk factors: History of ischemic heart disease.  No further cardiac evaluation is needed at this time.  If possible, the patient should remain on aspirin throughout the procedure.  If aspirin does need to be stopped, it should be done 1 week prior to the procedure and started postprocedure when adequate hemostasis has been achieved.       Plan:     Yoel was seen today for s/p ptca (percutaneous transluminal coronary angioplasty) and pre-op exam.    Diagnoses and all orders for this visit:    S/P PTCA (percutaneous  transluminal coronary angioplasty)    Preoperative cardiovascular examination    Hyperlipidemia, unspecified hyperlipidemia type    Macular degeneration, unspecified laterality, unspecified type          Rui Rodríguez MD  Consultative Cardiology

## 2020-01-22 NOTE — LETTER
January 22, 2020        Torres Wheeler MD  [address]  [address]  [place] [address]             [facility] - Cardiology  [address]  [place] [address]  Phone: [phone]   Patient: Yoel Martinez   MR Number: [id number]   YOB: 1960   Date of Visit: 1/22/2020       Dear Dr. Wheeler:    Thank you for referring Yoel Martinez to me for evaluation. Attached you will find relevant portions of my assessment and plan of care.    If you have questions, please do not hesitate to call me. I look forward to following Yoel Martinez along with you.    Sincerely,      Rui Rodríguez MD            CC  MD Khurram Avilesosure

## 2020-01-22 NOTE — ASSESSMENT & PLAN NOTE
Was exercising vigorously (running on tread mill ) with no chest pain or pulmonary symptoms. Prior to knee injury. He is on ASA, atorva, zetia and fish oil per cardiology. Lipid profile is excellent:  Lab Results   Component Value Date    CHOL 147 01/22/2020    CHOL 131 12/07/2018    CHOL 154 10/18/2017     Lab Results   Component Value Date    HDL 49 01/22/2020    HDL 49 12/07/2018    HDL 49 10/18/2017     Lab Results   Component Value Date    LDLCALC 79.8 01/22/2020    LDLCALC 56.6 (L) 12/07/2018    LDLCALC 91.8 10/18/2017     Lab Results   Component Value Date    TRIG 91 01/22/2020    TRIG 127 12/07/2018    TRIG 66 10/18/2017     Lab Results   Component Value Date    CHOLHDL 33.3 01/22/2020    CHOLHDL 37.4 12/07/2018    CHOLHDL 31.8 10/18/2017

## 2020-01-22 NOTE — PROGRESS NOTES
Chart has been dictated using voice recognition software.  It is not been reviewed carefully for any transcriptional errors due to this technology.   Subjective:   Patient ID:  Yoel Martinez is a 59 y.o. male who presents for follow-up of S/P PTCA (percutaneous transluminal coronary angioplasty) and Pre-op Exam (right knee surgery)      HPI: ***    Past Medical History:   Diagnosis Date    Coronary artery disease     Hyperlipidemia     Macular degeneration     legally blind    Stargardt's disease        Outpatient Medications Prior to Visit   Medication Sig Dispense Refill    aspirin (ECOTRIN) 81 MG EC tablet Take 81 mg by mouth once daily.      atorvastatin (LIPITOR) 80 MG tablet Take 1 tablet (80 mg total) by mouth once daily. 90 tablet 0    cholecalciferol, vitamin D3, (VITAMIN D3) 2,000 unit Cap Take 1 capsule by mouth once daily.      omega-3 fatty acids-vitamin E (FISH OIL) 1,000 mg Cap Take by mouth once daily.       saw palmetto 160 MG capsule Take 450 mg by mouth once daily.       ezetimibe (ZETIA) 10 mg tablet Take 1 tablet (10 mg total) by mouth once daily. 90 tablet 3     No facility-administered medications prior to visit.        ROS   Objective:   Physical Exam      Lab Results   Component Value Date     01/22/2020    K 4.6 01/22/2020    BUN 15 01/22/2020    CREATININE 0.9 01/22/2020     01/22/2020    HGBA1C 5.8 (H) 12/07/2018    CHOL 147 01/22/2020    HDL 49 01/22/2020    LDLCALC 79.8 01/22/2020    TRIG 91 01/22/2020    CHOLHDL 33.3 01/22/2020    HGB 14.8 01/22/2020    HCT 47.7 01/22/2020     01/22/2020    INR 0.9 11/25/2014       Assessment:     No diagnosis found.    Plan:     There are no diagnoses linked to this encounter.      Rui Rodríguez MD  Consultative Cardiology

## 2020-12-28 DIAGNOSIS — Z00.00 ROUTINE GENERAL MEDICAL EXAMINATION AT A HEALTH CARE FACILITY: Primary | ICD-10-CM

## 2021-02-05 ENCOUNTER — OFFICE VISIT (OUTPATIENT)
Dept: CARDIOLOGY | Facility: CLINIC | Age: 61
End: 2021-02-05
Payer: MEDICARE

## 2021-02-05 ENCOUNTER — HOSPITAL ENCOUNTER (OUTPATIENT)
Dept: CARDIOLOGY | Facility: CLINIC | Age: 61
Discharge: HOME | End: 2021-02-05
Payer: MEDICARE

## 2021-02-05 ENCOUNTER — CLINICAL SUPPORT (OUTPATIENT)
Dept: INTERNAL MEDICINE | Facility: CLINIC | Age: 61
End: 2021-02-05
Payer: MEDICARE

## 2021-02-05 ENCOUNTER — HOSPITAL ENCOUNTER (OUTPATIENT)
Dept: RADIOLOGY | Facility: HOSPITAL | Age: 61
Discharge: HOME | End: 2021-02-05
Attending: INTERNAL MEDICINE
Payer: MEDICARE

## 2021-02-05 ENCOUNTER — OFFICE VISIT (OUTPATIENT)
Dept: INTERNAL MEDICINE | Facility: CLINIC | Age: 61
End: 2021-02-05
Payer: COMMERCIAL

## 2021-02-05 VITALS
BODY MASS INDEX: 29.93 KG/M2 | SYSTOLIC BLOOD PRESSURE: 130 MMHG | HEIGHT: 67 IN | HEART RATE: 52 BPM | WEIGHT: 190.69 LBS | DIASTOLIC BLOOD PRESSURE: 74 MMHG

## 2021-02-05 VITALS
DIASTOLIC BLOOD PRESSURE: 73 MMHG | HEIGHT: 67 IN | WEIGHT: 187 LBS | BODY MASS INDEX: 29.35 KG/M2 | TEMPERATURE: 98 F | SYSTOLIC BLOOD PRESSURE: 153 MMHG | HEART RATE: 51 BPM

## 2021-02-05 DIAGNOSIS — I25.10 ATHEROSCLEROSIS OF NATIVE CORONARY ARTERY OF NATIVE HEART WITHOUT ANGINA PECTORIS: Primary | ICD-10-CM

## 2021-02-05 DIAGNOSIS — Z98.61 S/P PTCA (PERCUTANEOUS TRANSLUMINAL CORONARY ANGIOPLASTY): ICD-10-CM

## 2021-02-05 DIAGNOSIS — Z00.00 ROUTINE GENERAL MEDICAL EXAMINATION AT A HEALTH CARE FACILITY: ICD-10-CM

## 2021-02-05 DIAGNOSIS — E78.5 DYSLIPIDEMIA: ICD-10-CM

## 2021-02-05 DIAGNOSIS — Z82.49 FAMILY HISTORY OF EARLY CAD: ICD-10-CM

## 2021-02-05 DIAGNOSIS — E78.5 HYPERLIPIDEMIA, UNSPECIFIED HYPERLIPIDEMIA TYPE: ICD-10-CM

## 2021-02-05 DIAGNOSIS — R03.0 ELEVATED BLOOD PRESSURE READING: ICD-10-CM

## 2021-02-05 DIAGNOSIS — Z00.00 ANNUAL PHYSICAL EXAM: Primary | ICD-10-CM

## 2021-02-05 DIAGNOSIS — Z00.00 ENCOUNTER FOR ANNUAL HEALTH EXAMINATION: Primary | ICD-10-CM

## 2021-02-05 PROBLEM — Z01.810 PREOPERATIVE CARDIOVASCULAR EXAMINATION: Status: RESOLVED | Noted: 2020-01-22 | Resolved: 2021-02-05

## 2021-02-05 LAB
ALBUMIN SERPL BCP-MCNC: 4.4 G/DL (ref 3.5–5.2)
ALP SERPL-CCNC: 78 U/L (ref 55–135)
ALT SERPL W/O P-5'-P-CCNC: 41 U/L (ref 10–44)
ANION GAP SERPL CALC-SCNC: 10 MMOL/L (ref 8–16)
AST SERPL-CCNC: 40 U/L (ref 10–40)
BILIRUB SERPL-MCNC: 1.8 MG/DL (ref 0.1–1)
BUN SERPL-MCNC: 18 MG/DL (ref 6–20)
CALCIUM SERPL-MCNC: 9.7 MG/DL (ref 8.7–10.5)
CHLORIDE SERPL-SCNC: 106 MMOL/L (ref 95–110)
CHOLEST SERPL-MCNC: 131 MG/DL (ref 120–199)
CHOLEST/HDLC SERPL: 2.8 {RATIO} (ref 2–5)
CO2 SERPL-SCNC: 30 MMOL/L (ref 23–29)
COMPLEXED PSA SERPL-MCNC: 0.39 NG/ML (ref 0–4)
CREAT SERPL-MCNC: 1 MG/DL (ref 0.5–1.4)
ERYTHROCYTE [DISTWIDTH] IN BLOOD BY AUTOMATED COUNT: 13 % (ref 11.5–14.5)
EST. GFR  (AFRICAN AMERICAN): >60 ML/MIN/1.73 M^2
EST. GFR  (NON AFRICAN AMERICAN): >60 ML/MIN/1.73 M^2
ESTIMATED AVG GLUCOSE: 123 MG/DL (ref 68–131)
GLUCOSE SERPL-MCNC: 120 MG/DL (ref 70–110)
HBA1C MFR BLD: 5.9 % (ref 4–5.6)
HCT VFR BLD AUTO: 47.1 % (ref 40–54)
HDLC SERPL-MCNC: 46 MG/DL (ref 40–75)
HDLC SERPL: 35.1 % (ref 20–50)
HGB BLD-MCNC: 15 G/DL (ref 14–18)
LDLC SERPL CALC-MCNC: 69 MG/DL (ref 63–159)
MCH RBC QN AUTO: 31.6 PG (ref 27–31)
MCHC RBC AUTO-ENTMCNC: 31.8 G/DL (ref 32–36)
MCV RBC AUTO: 99 FL (ref 82–98)
NONHDLC SERPL-MCNC: 85 MG/DL
PLATELET # BLD AUTO: 212 K/UL (ref 150–350)
PMV BLD AUTO: 10.8 FL (ref 9.2–12.9)
POTASSIUM SERPL-SCNC: 5.4 MMOL/L (ref 3.5–5.1)
PROT SERPL-MCNC: 7.7 G/DL (ref 6–8.4)
RBC # BLD AUTO: 4.74 M/UL (ref 4.6–6.2)
SODIUM SERPL-SCNC: 146 MMOL/L (ref 136–145)
T4 FREE SERPL-MCNC: 0.87 NG/DL (ref 0.71–1.51)
TRIGL SERPL-MCNC: 80 MG/DL (ref 30–150)
TSH SERPL DL<=0.005 MIU/L-ACNC: 4.32 UIU/ML (ref 0.4–4)
WBC # BLD AUTO: 5.7 K/UL (ref 3.9–12.7)

## 2021-02-05 PROCEDURE — 85027 COMPLETE CBC AUTOMATED: CPT

## 2021-02-05 PROCEDURE — 80061 LIPID PANEL: CPT

## 2021-02-05 PROCEDURE — 93005 ELECTROCARDIOGRAM TRACING: CPT | Mod: PBBFAC | Performed by: INTERNAL MEDICINE

## 2021-02-05 PROCEDURE — 99396 PREV VISIT EST AGE 40-64: CPT | Mod: S$GLB,,, | Performed by: INTERNAL MEDICINE

## 2021-02-05 PROCEDURE — 90686 IIV4 VACC NO PRSV 0.5 ML IM: CPT | Mod: PBBFAC

## 2021-02-05 PROCEDURE — 93010 EKG 12-LEAD: ICD-10-PCS | Mod: S$PBB,,, | Performed by: INTERNAL MEDICINE

## 2021-02-05 PROCEDURE — 83036 HEMOGLOBIN GLYCOSYLATED A1C: CPT

## 2021-02-05 PROCEDURE — 99999 PR PBB SHADOW E&M-EST. PATIENT-LVL III: CPT | Mod: PBBFAC,,, | Performed by: INTERNAL MEDICINE

## 2021-02-05 PROCEDURE — 71046 XR CHEST PA AND LATERAL: ICD-10-PCS | Mod: 26,,, | Performed by: RADIOLOGY

## 2021-02-05 PROCEDURE — 71046 X-RAY EXAM CHEST 2 VIEWS: CPT | Mod: 26,,, | Performed by: RADIOLOGY

## 2021-02-05 PROCEDURE — 99214 PR OFFICE/OUTPT VISIT, EST, LEVL IV, 30-39 MIN: ICD-10-PCS | Mod: 25,S$PBB,, | Performed by: PHYSICIAN ASSISTANT

## 2021-02-05 PROCEDURE — 93010 ELECTROCARDIOGRAM REPORT: CPT | Mod: S$PBB,,, | Performed by: INTERNAL MEDICINE

## 2021-02-05 PROCEDURE — 99214 OFFICE O/P EST MOD 30 MIN: CPT | Mod: 25,S$PBB,, | Performed by: PHYSICIAN ASSISTANT

## 2021-02-05 PROCEDURE — 99999 PR PBB SHADOW E&M-EST. PATIENT-LVL III: ICD-10-PCS | Mod: PBBFAC,,, | Performed by: INTERNAL MEDICINE

## 2021-02-05 PROCEDURE — 99396 PR PREVENTIVE VISIT,EST,40-64: ICD-10-PCS | Mod: S$GLB,,, | Performed by: INTERNAL MEDICINE

## 2021-02-05 PROCEDURE — 99214 OFFICE O/P EST MOD 30 MIN: CPT | Mod: PBBFAC,25 | Performed by: PHYSICIAN ASSISTANT

## 2021-02-05 PROCEDURE — 99999 PR PBB SHADOW E&M-EST. PATIENT-LVL IV: CPT | Mod: PBBFAC,,, | Performed by: PHYSICIAN ASSISTANT

## 2021-02-05 PROCEDURE — 71046 X-RAY EXAM CHEST 2 VIEWS: CPT | Mod: TC,FY

## 2021-02-05 PROCEDURE — 80053 COMPREHEN METABOLIC PANEL: CPT

## 2021-02-05 PROCEDURE — 84153 ASSAY OF PSA TOTAL: CPT

## 2021-02-05 PROCEDURE — 99999 PR PBB SHADOW E&M-EST. PATIENT-LVL IV: ICD-10-PCS | Mod: PBBFAC,,, | Performed by: PHYSICIAN ASSISTANT

## 2021-02-05 PROCEDURE — 84439 ASSAY OF FREE THYROXINE: CPT

## 2021-02-05 PROCEDURE — 84443 ASSAY THYROID STIM HORMONE: CPT

## 2021-02-05 RX ORDER — EZETIMIBE 10 MG/1
10 TABLET ORAL DAILY
Qty: 90 TABLET | Refills: 3 | Status: SHIPPED | OUTPATIENT
Start: 2021-02-05 | End: 2022-01-07 | Stop reason: SDUPTHER

## 2021-02-05 RX ORDER — ATORVASTATIN CALCIUM 80 MG/1
80 TABLET, FILM COATED ORAL DAILY
Qty: 90 TABLET | Refills: 3 | Status: SHIPPED | OUTPATIENT
Start: 2021-02-05 | End: 2022-02-09 | Stop reason: SDUPTHER

## 2021-02-24 ENCOUNTER — PATIENT MESSAGE (OUTPATIENT)
Dept: CARDIOLOGY | Facility: CLINIC | Age: 61
End: 2021-02-24

## 2021-09-22 ENCOUNTER — TELEPHONE (OUTPATIENT)
Dept: TRANSPLANT | Facility: CLINIC | Age: 61
End: 2021-09-22

## 2022-01-06 DIAGNOSIS — Z00.00 ROUTINE GENERAL MEDICAL EXAMINATION AT A HEALTH CARE FACILITY: Primary | ICD-10-CM

## 2022-01-07 DIAGNOSIS — Z98.61 S/P PTCA (PERCUTANEOUS TRANSLUMINAL CORONARY ANGIOPLASTY): ICD-10-CM

## 2022-01-07 DIAGNOSIS — E78.5 HYPERLIPIDEMIA, UNSPECIFIED HYPERLIPIDEMIA TYPE: ICD-10-CM

## 2022-01-07 RX ORDER — EZETIMIBE 10 MG/1
10 TABLET ORAL DAILY
Qty: 90 TABLET | Refills: 3 | Status: SHIPPED | OUTPATIENT
Start: 2022-01-07 | End: 2023-01-26 | Stop reason: SDUPTHER

## 2022-01-07 NOTE — TELEPHONE ENCOUNTER
----- Message from Kianna Graham sent at 1/7/2022  8:53 AM CST -----  Regarding: Refill  Pt need a 30 day refill for his Zetia 10 mg sent to [facility] mail service because he will run out before his visit.    LOV 2/5/21 FELECIA Liriano    [facility] - [place], [place] - [address] AT [facility]   Phone:  [phone]  Fax:  [phone]      Thanks

## 2022-02-09 ENCOUNTER — CLINICAL SUPPORT (OUTPATIENT)
Dept: INTERNAL MEDICINE | Facility: CLINIC | Age: 62
End: 2022-02-09
Payer: MEDICARE

## 2022-02-09 ENCOUNTER — HOSPITAL ENCOUNTER (OUTPATIENT)
Dept: CARDIOLOGY | Facility: CLINIC | Age: 62
Discharge: HOME | End: 2022-02-09
Payer: MEDICARE

## 2022-02-09 ENCOUNTER — HOSPITAL ENCOUNTER (OUTPATIENT)
Dept: RADIOLOGY | Facility: HOSPITAL | Age: 62
Discharge: HOME | End: 2022-02-09
Attending: INTERNAL MEDICINE
Payer: MEDICARE

## 2022-02-09 ENCOUNTER — OFFICE VISIT (OUTPATIENT)
Dept: CARDIOLOGY | Facility: CLINIC | Age: 62
End: 2022-02-09
Payer: MEDICARE

## 2022-02-09 ENCOUNTER — OFFICE VISIT (OUTPATIENT)
Dept: PULMONOLOGY | Facility: CLINIC | Age: 62
End: 2022-02-09
Payer: MEDICARE

## 2022-02-09 VITALS
DIASTOLIC BLOOD PRESSURE: 74 MMHG | HEART RATE: 50 BPM | WEIGHT: 194 LBS | HEIGHT: 67 IN | SYSTOLIC BLOOD PRESSURE: 152 MMHG | RESPIRATION RATE: 12 BRPM | BODY MASS INDEX: 30.45 KG/M2

## 2022-02-09 VITALS
HEART RATE: 56 BPM | HEIGHT: 67 IN | WEIGHT: 197.06 LBS | BODY MASS INDEX: 30.93 KG/M2 | DIASTOLIC BLOOD PRESSURE: 70 MMHG | SYSTOLIC BLOOD PRESSURE: 153 MMHG

## 2022-02-09 DIAGNOSIS — Z00.00 ANNUAL PHYSICAL EXAM: Primary | ICD-10-CM

## 2022-02-09 DIAGNOSIS — Z00.00 ROUTINE GENERAL MEDICAL EXAMINATION AT A HEALTH CARE FACILITY: ICD-10-CM

## 2022-02-09 DIAGNOSIS — Z98.61 S/P PTCA (PERCUTANEOUS TRANSLUMINAL CORONARY ANGIOPLASTY): Primary | ICD-10-CM

## 2022-02-09 DIAGNOSIS — E78.5 DYSLIPIDEMIA: ICD-10-CM

## 2022-02-09 DIAGNOSIS — H35.30 MACULAR DEGENERATION, UNSPECIFIED LATERALITY, UNSPECIFIED TYPE: ICD-10-CM

## 2022-02-09 DIAGNOSIS — E78.5 HYPERLIPIDEMIA, UNSPECIFIED HYPERLIPIDEMIA TYPE: ICD-10-CM

## 2022-02-09 LAB
ALBUMIN SERPL BCP-MCNC: 4.1 G/DL (ref 3.5–5.2)
ALP SERPL-CCNC: 72 U/L (ref 55–135)
ALT SERPL W/O P-5'-P-CCNC: 32 U/L (ref 10–44)
ANION GAP SERPL CALC-SCNC: 7 MMOL/L (ref 8–16)
AST SERPL-CCNC: 35 U/L (ref 10–40)
BILIRUB SERPL-MCNC: 2.1 MG/DL (ref 0.1–1)
BUN SERPL-MCNC: 16 MG/DL (ref 6–20)
CALCIUM SERPL-MCNC: 10 MG/DL (ref 8.7–10.5)
CHLORIDE SERPL-SCNC: 101 MMOL/L (ref 95–110)
CHOLEST SERPL-MCNC: 148 MG/DL (ref 120–199)
CHOLEST/HDLC SERPL: 3 {RATIO} (ref 2–5)
CO2 SERPL-SCNC: 31 MMOL/L (ref 23–29)
COMPLEXED PSA SERPL-MCNC: 0.4 NG/ML (ref 0–4)
CREAT SERPL-MCNC: 1 MG/DL (ref 0.5–1.4)
ERYTHROCYTE [DISTWIDTH] IN BLOOD BY AUTOMATED COUNT: 13.2 % (ref 11.5–14.5)
EST. GFR  (AFRICAN AMERICAN): >60 ML/MIN/1.73 M^2
EST. GFR  (NON AFRICAN AMERICAN): >60 ML/MIN/1.73 M^2
GLUCOSE SERPL-MCNC: 120 MG/DL (ref 70–110)
HCT VFR BLD AUTO: 44.1 % (ref 40–54)
HDLC SERPL-MCNC: 49 MG/DL (ref 40–75)
HDLC SERPL: 33.1 % (ref 20–50)
HGB BLD-MCNC: 14 G/DL (ref 14–18)
LDLC SERPL CALC-MCNC: 74.6 MG/DL (ref 63–159)
MCH RBC QN AUTO: 31.7 PG (ref 27–31)
MCHC RBC AUTO-ENTMCNC: 31.7 G/DL (ref 32–36)
MCV RBC AUTO: 100 FL (ref 82–98)
NONHDLC SERPL-MCNC: 99 MG/DL
PLATELET # BLD AUTO: 208 K/UL (ref 150–450)
PMV BLD AUTO: 10.2 FL (ref 9.2–12.9)
POTASSIUM SERPL-SCNC: 4.5 MMOL/L (ref 3.5–5.1)
PROT SERPL-MCNC: 7.2 G/DL (ref 6–8.4)
RBC # BLD AUTO: 4.42 M/UL (ref 4.6–6.2)
SODIUM SERPL-SCNC: 139 MMOL/L (ref 136–145)
TRIGL SERPL-MCNC: 122 MG/DL (ref 30–150)
WBC # BLD AUTO: 6.31 K/UL (ref 3.9–12.7)

## 2022-02-09 PROCEDURE — 71046 X-RAY EXAM CHEST 2 VIEWS: CPT | Mod: TC,FY

## 2022-02-09 PROCEDURE — 99999 PR PBB SHADOW E&M-EST. PATIENT-LVL III: CPT | Mod: PBBFAC,,, | Performed by: INTERNAL MEDICINE

## 2022-02-09 PROCEDURE — 93010 ELECTROCARDIOGRAM REPORT: CPT | Mod: S$PBB,,, | Performed by: INTERNAL MEDICINE

## 2022-02-09 PROCEDURE — 84153 ASSAY OF PSA TOTAL: CPT | Performed by: INTERNAL MEDICINE

## 2022-02-09 PROCEDURE — 80061 LIPID PANEL: CPT | Performed by: INTERNAL MEDICINE

## 2022-02-09 PROCEDURE — 99999 PR PBB SHADOW E&M-EST. PATIENT-LVL IV: ICD-10-PCS | Mod: PBBFAC,,, | Performed by: INTERNAL MEDICINE

## 2022-02-09 PROCEDURE — 85027 COMPLETE CBC AUTOMATED: CPT | Performed by: INTERNAL MEDICINE

## 2022-02-09 PROCEDURE — 71046 XR CHEST PA AND LATERAL: ICD-10-PCS | Mod: 26,,, | Performed by: RADIOLOGY

## 2022-02-09 PROCEDURE — 93010 EKG 12-LEAD: ICD-10-PCS | Mod: S$PBB,,, | Performed by: INTERNAL MEDICINE

## 2022-02-09 PROCEDURE — 93005 ELECTROCARDIOGRAM TRACING: CPT | Mod: PBBFAC | Performed by: INTERNAL MEDICINE

## 2022-02-09 PROCEDURE — 99386 PREV VISIT NEW AGE 40-64: CPT | Mod: S$GLB,,, | Performed by: INTERNAL MEDICINE

## 2022-02-09 PROCEDURE — 99999 PR PBB SHADOW E&M-EST. PATIENT-LVL I: ICD-10-PCS | Mod: PBBFAC,,,

## 2022-02-09 PROCEDURE — 99214 PR OFFICE/OUTPT VISIT, EST, LEVL IV, 30-39 MIN: ICD-10-PCS | Mod: S$PBB,,, | Performed by: INTERNAL MEDICINE

## 2022-02-09 PROCEDURE — 99999 PR PBB SHADOW E&M-EST. PATIENT-LVL III: ICD-10-PCS | Mod: PBBFAC,,, | Performed by: INTERNAL MEDICINE

## 2022-02-09 PROCEDURE — 99214 OFFICE O/P EST MOD 30 MIN: CPT | Mod: PBBFAC,25 | Performed by: INTERNAL MEDICINE

## 2022-02-09 PROCEDURE — 71046 X-RAY EXAM CHEST 2 VIEWS: CPT | Mod: 26,,, | Performed by: RADIOLOGY

## 2022-02-09 PROCEDURE — 99214 OFFICE O/P EST MOD 30 MIN: CPT | Mod: S$PBB,,, | Performed by: INTERNAL MEDICINE

## 2022-02-09 PROCEDURE — 99999 PR PBB SHADOW E&M-EST. PATIENT-LVL I: CPT | Mod: PBBFAC,,,

## 2022-02-09 PROCEDURE — 99386 PR PREVENTIVE VISIT,NEW,40-64: ICD-10-PCS | Mod: S$GLB,,, | Performed by: INTERNAL MEDICINE

## 2022-02-09 PROCEDURE — 80053 COMPREHEN METABOLIC PANEL: CPT | Performed by: INTERNAL MEDICINE

## 2022-02-09 PROCEDURE — 99999 PR PBB SHADOW E&M-EST. PATIENT-LVL IV: CPT | Mod: PBBFAC,,, | Performed by: INTERNAL MEDICINE

## 2022-02-09 RX ORDER — ATORVASTATIN CALCIUM 80 MG/1
80 TABLET, FILM COATED ORAL DAILY
Qty: 90 TABLET | Refills: 3 | Status: SHIPPED | OUTPATIENT
Start: 2022-02-09 | End: 2023-01-26 | Stop reason: SDUPTHER

## 2022-02-09 NOTE — Clinical Note
Thank you for allowing me to follow-up with Yoel Martinez for coronary artery disease. Please see my note for details of this encounter. If you have any questions, please contact me.  Thank you again for allowing to participate in the care of this patient.
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
## 2023-01-27 DIAGNOSIS — Z00.00 ROUTINE GENERAL MEDICAL EXAMINATION AT A HEALTH CARE FACILITY: Primary | ICD-10-CM

## 2023-03-08 ENCOUNTER — CLINICAL SUPPORT (OUTPATIENT)
Dept: INTERNAL MEDICINE | Facility: CLINIC | Age: 63
End: 2023-03-08
Payer: MEDICARE

## 2023-03-08 ENCOUNTER — HOSPITAL ENCOUNTER (OUTPATIENT)
Dept: CARDIOLOGY | Facility: CLINIC | Age: 63
Discharge: HOME | End: 2023-03-08
Payer: MEDICARE

## 2023-03-08 ENCOUNTER — OFFICE VISIT (OUTPATIENT)
Dept: CARDIOLOGY | Facility: CLINIC | Age: 63
End: 2023-03-08
Payer: MEDICARE

## 2023-03-08 ENCOUNTER — OFFICE VISIT (OUTPATIENT)
Dept: INTERNAL MEDICINE | Facility: CLINIC | Age: 63
End: 2023-03-08
Payer: COMMERCIAL

## 2023-03-08 VITALS
HEART RATE: 44 BPM | OXYGEN SATURATION: 100 % | BODY MASS INDEX: 29.9 KG/M2 | SYSTOLIC BLOOD PRESSURE: 149 MMHG | DIASTOLIC BLOOD PRESSURE: 78 MMHG | WEIGHT: 190.5 LBS | HEIGHT: 67 IN

## 2023-03-08 VITALS
HEART RATE: 90 BPM | SYSTOLIC BLOOD PRESSURE: 165 MMHG | WEIGHT: 190.81 LBS | BODY MASS INDEX: 29.95 KG/M2 | HEIGHT: 67 IN | DIASTOLIC BLOOD PRESSURE: 76 MMHG

## 2023-03-08 DIAGNOSIS — Z00.00 ROUTINE GENERAL MEDICAL EXAMINATION AT A HEALTH CARE FACILITY: ICD-10-CM

## 2023-03-08 DIAGNOSIS — R53.0 NEOPLASTIC MALIGNANT RELATED FATIGUE: ICD-10-CM

## 2023-03-08 DIAGNOSIS — Z98.61 S/P PTCA (PERCUTANEOUS TRANSLUMINAL CORONARY ANGIOPLASTY): ICD-10-CM

## 2023-03-08 DIAGNOSIS — Z12.5 SPECIAL SCREENING FOR MALIGNANT NEOPLASM OF PROSTATE: ICD-10-CM

## 2023-03-08 DIAGNOSIS — R53.83 FATIGUE, UNSPECIFIED TYPE: ICD-10-CM

## 2023-03-08 DIAGNOSIS — E78.5 HYPERLIPIDEMIA, UNSPECIFIED HYPERLIPIDEMIA TYPE: Primary | ICD-10-CM

## 2023-03-08 DIAGNOSIS — I10 PRIMARY HYPERTENSION: ICD-10-CM

## 2023-03-08 DIAGNOSIS — R73.09 IMPAIRED GLUCOSE TOLERANCE TEST: ICD-10-CM

## 2023-03-08 DIAGNOSIS — Z12.11 COLON CANCER SCREENING: ICD-10-CM

## 2023-03-08 DIAGNOSIS — Z00.00 ENCOUNTER FOR ANNUAL HEALTH EXAMINATION: Primary | ICD-10-CM

## 2023-03-08 DIAGNOSIS — R79.9 ABNORMAL BLOOD CHEMISTRY: ICD-10-CM

## 2023-03-08 DIAGNOSIS — E78.5 DYSLIPIDEMIA: ICD-10-CM

## 2023-03-08 DIAGNOSIS — I25.10 ATHEROSCLEROSIS OF NATIVE CORONARY ARTERY OF NATIVE HEART WITHOUT ANGINA PECTORIS: Primary | ICD-10-CM

## 2023-03-08 LAB
ALBUMIN SERPL BCP-MCNC: 4.1 G/DL (ref 3.5–5.2)
ALP SERPL-CCNC: 67 U/L (ref 55–135)
ALT SERPL W/O P-5'-P-CCNC: 30 U/L (ref 10–44)
ANION GAP SERPL CALC-SCNC: 7 MMOL/L (ref 8–16)
AST SERPL-CCNC: 34 U/L (ref 10–40)
BILIRUB SERPL-MCNC: 1.7 MG/DL (ref 0.1–1)
BUN SERPL-MCNC: 16 MG/DL (ref 6–20)
CALCIUM SERPL-MCNC: 9.8 MG/DL (ref 8.7–10.5)
CHLORIDE SERPL-SCNC: 109 MMOL/L (ref 95–110)
CHOLEST SERPL-MCNC: 130 MG/DL (ref 120–199)
CHOLEST/HDLC SERPL: 2.7 {RATIO} (ref 2–5)
CO2 SERPL-SCNC: 29 MMOL/L (ref 23–29)
COMPLEXED PSA SERPL-MCNC: 0.42 NG/ML (ref 0–4)
CREAT SERPL-MCNC: 1.1 MG/DL (ref 0.5–1.4)
ERYTHROCYTE [DISTWIDTH] IN BLOOD BY AUTOMATED COUNT: 13.4 % (ref 11.5–14.5)
EST. GFR  (NO RACE VARIABLE): >60 ML/MIN/1.73 M^2
ESTIMATED AVG GLUCOSE: 126 MG/DL (ref 68–131)
GLUCOSE SERPL-MCNC: 117 MG/DL (ref 70–110)
HBA1C MFR BLD: 6 % (ref 4–5.6)
HCT VFR BLD AUTO: 44.3 % (ref 40–54)
HDLC SERPL-MCNC: 48 MG/DL (ref 40–75)
HDLC SERPL: 36.9 % (ref 20–50)
HGB BLD-MCNC: 13.7 G/DL (ref 14–18)
LDLC SERPL CALC-MCNC: 70.2 MG/DL (ref 63–159)
MCH RBC QN AUTO: 30.5 PG (ref 27–31)
MCHC RBC AUTO-ENTMCNC: 30.9 G/DL (ref 32–36)
MCV RBC AUTO: 99 FL (ref 82–98)
NONHDLC SERPL-MCNC: 82 MG/DL
PLATELET # BLD AUTO: 212 K/UL (ref 150–450)
PMV BLD AUTO: 10.3 FL (ref 9.2–12.9)
POTASSIUM SERPL-SCNC: 4.8 MMOL/L (ref 3.5–5.1)
PROT SERPL-MCNC: 7 G/DL (ref 6–8.4)
RBC # BLD AUTO: 4.49 M/UL (ref 4.6–6.2)
SODIUM SERPL-SCNC: 145 MMOL/L (ref 136–145)
TRIGL SERPL-MCNC: 59 MG/DL (ref 30–150)
TSH SERPL DL<=0.005 MIU/L-ACNC: 3.25 UIU/ML (ref 0.4–4)
WBC # BLD AUTO: 5.44 K/UL (ref 3.9–12.7)

## 2023-03-08 PROCEDURE — 84443 ASSAY THYROID STIM HORMONE: CPT | Performed by: INTERNAL MEDICINE

## 2023-03-08 PROCEDURE — 83036 HEMOGLOBIN GLYCOSYLATED A1C: CPT | Performed by: INTERNAL MEDICINE

## 2023-03-08 PROCEDURE — 84153 ASSAY OF PSA TOTAL: CPT | Performed by: INTERNAL MEDICINE

## 2023-03-08 PROCEDURE — 99214 PR OFFICE/OUTPT VISIT, EST, LEVL IV, 30-39 MIN: ICD-10-PCS | Mod: S$PBB,,, | Performed by: PHYSICIAN ASSISTANT

## 2023-03-08 PROCEDURE — 99999 PR PBB SHADOW E&M-EST. PATIENT-LVL IV: CPT | Mod: PBBFAC,,, | Performed by: PHYSICIAN ASSISTANT

## 2023-03-08 PROCEDURE — 99214 OFFICE O/P EST MOD 30 MIN: CPT | Mod: PBBFAC | Performed by: PHYSICIAN ASSISTANT

## 2023-03-08 PROCEDURE — 80061 LIPID PANEL: CPT | Performed by: INTERNAL MEDICINE

## 2023-03-08 PROCEDURE — 93005 ELECTROCARDIOGRAM TRACING: CPT | Mod: PBBFAC | Performed by: INTERNAL MEDICINE

## 2023-03-08 PROCEDURE — 99999 PR PBB SHADOW E&M-EST. PATIENT-LVL IV: ICD-10-PCS | Mod: PBBFAC,,, | Performed by: PHYSICIAN ASSISTANT

## 2023-03-08 PROCEDURE — 99214 OFFICE O/P EST MOD 30 MIN: CPT | Mod: S$PBB,,, | Performed by: PHYSICIAN ASSISTANT

## 2023-03-08 PROCEDURE — 99396 PREV VISIT EST AGE 40-64: CPT | Mod: S$GLB,,, | Performed by: INTERNAL MEDICINE

## 2023-03-08 PROCEDURE — 99999 PR PBB SHADOW E&M-EST. PATIENT-LVL III: CPT | Mod: PBBFAC,,, | Performed by: INTERNAL MEDICINE

## 2023-03-08 PROCEDURE — 99396 PR PREVENTIVE VISIT,EST,40-64: ICD-10-PCS | Mod: S$GLB,,, | Performed by: INTERNAL MEDICINE

## 2023-03-08 PROCEDURE — 93010 ELECTROCARDIOGRAM REPORT: CPT | Mod: S$PBB,,, | Performed by: INTERNAL MEDICINE

## 2023-03-08 PROCEDURE — 80053 COMPREHEN METABOLIC PANEL: CPT | Performed by: INTERNAL MEDICINE

## 2023-03-08 PROCEDURE — 99999 PR PBB SHADOW E&M-EST. PATIENT-LVL III: ICD-10-PCS | Mod: PBBFAC,,, | Performed by: INTERNAL MEDICINE

## 2023-03-08 PROCEDURE — 93010 EKG 12-LEAD: ICD-10-PCS | Mod: S$PBB,,, | Performed by: INTERNAL MEDICINE

## 2023-03-08 PROCEDURE — 85027 COMPLETE CBC AUTOMATED: CPT | Performed by: INTERNAL MEDICINE

## 2023-03-08 RX ORDER — AMLODIPINE BESYLATE 5 MG/1
5 TABLET ORAL DAILY
Qty: 90 TABLET | Refills: 3 | Status: SHIPPED | OUTPATIENT
Start: 2023-03-08 | End: 2024-02-19 | Stop reason: SDUPTHER

## 2023-03-08 NOTE — PROGRESS NOTES
General Cardiology Clinic Note  Reason for Visit: Annual follow up  Last Clinic Visit: 2/9/2022  General Cardiologist: Dr. Rodríguez     HPI:   Yoel Martinez is a 62 y.o. male who presents for annual follow up.     Problems:  CAD s/p PCI/JACKSON to pLAD and mRCA 11/2014  Dyslipidemia    Interval HPI:   Patient presents for annual follow up. He denies symptoms of CAD, CHF, arrhythmia, hypotension, TIA, claudication. He exercises at least three times a week at the gym; he uses the treadmill or elliptical. He has chronic muscle cramps for many years; he plans to start using CoQ10. He also reports chronic back pain for which he has started taking Ibuprofen regularly. He was advised by his PCP today to switch to Tylenol instead.     2/5/2021 HPI (me)  Patient with asymptomatic (diagnosed by ETT, but may have had some exertional dyspnea) coronary artery disease s/p PCI on 24-Nov-2014 with drug eluting with stents placed to proximal LAD and mid RCA presents for one year follow up.       Patient denies any chest discomfort on exertion or at rest. Patient denies any dyspnea at rest or on exertion, orthopnea, PND, or edema.  Patient denies any palpitations, lightheadedness, or syncope. He goes to the gym at least 4 days a week for about 2.5 hours. He had his meniscus surgery nearly a year ago, but has only been back jogging for a couple months and does not think he will ever be able to run like he did before the surgery. He will also use the elliptical and do strengthening exercises. He admits that his diet is not heart healthy; he enjoys classic high sodium cajun food and drinks beer.     ROS:      Review of Systems   Constitutional: Negative for diaphoresis, malaise/fatigue, weight gain and weight loss.   HENT:  Negative for nosebleeds.    Eyes:  Negative for vision loss in left eye, vision loss in right eye and visual disturbance.   Cardiovascular:  Negative for chest pain, claudication, dyspnea on exertion, irregular  heartbeat, leg swelling, near-syncope, orthopnea, palpitations, paroxysmal nocturnal dyspnea and syncope.   Respiratory:  Negative for cough, shortness of breath, sleep disturbances due to breathing, snoring and wheezing.    Hematologic/Lymphatic: Negative for bleeding problem. Does not bruise/bleed easily.   Skin:  Negative for poor wound healing and rash.   Musculoskeletal:  Positive for back pain and joint pain. Negative for muscle cramps and myalgias.   Gastrointestinal:  Negative for bloating, abdominal pain, diarrhea, heartburn, melena, nausea and vomiting.   Genitourinary:  Negative for hematuria and nocturia.   Neurological:  Negative for brief paralysis, dizziness, headaches, light-headedness, numbness and weakness.   Psychiatric/Behavioral:  Negative for depression.    Allergic/Immunologic: Negative for hives.     PMH:     Past Medical History:   Diagnosis Date    Coronary artery disease     Stents 2014.    Hyperlipidemia     Macular degeneration     Legally blind    Prediabetes     Stargardt's disease     Subclinical hypothyroidism      Past Surgical History:   Procedure Laterality Date    CARDIAC CATHETERIZATION  11/25/14    Successful 2vs JACKSON for LAD and RCA disease    COLONOSCOPY      Normal at age 52 with plan for 10 yr f/u.    Left thigh tumor      Excision of calcifications.    REPAIR OF MENISCUS OF KNEE Right     TONSILLECTOMY       Allergies:     Review of patient's allergies indicates:   Allergen Reactions    Penicillins Rash     Medications:     Current Outpatient Medications on File Prior to Visit   Medication Sig Dispense Refill    aspirin (ECOTRIN) 81 MG EC tablet Take 81 mg by mouth once daily.      atorvastatin (LIPITOR) 80 MG tablet Take 1 tablet (80 mg total) by mouth once daily. 90 tablet 3    cholecalciferol, vitamin D3, (VITAMIN D3) 50 mcg (2,000 unit) Cap Take 1 capsule by mouth once daily.      ezetimibe (ZETIA) 10 mg tablet Take 1 tablet (10 mg total) by mouth once daily. 90 tablet  "3    omega-3 fatty acids-vitamin E 1,000 mg Cap Take 2,400 mg by mouth 2 (two) times a day.      potassium 99 mg Tab Take by mouth once daily.      saw palmetto 160 MG capsule Take 450 mg by mouth once daily.        No current facility-administered medications on file prior to visit.     Social History:     Social History     Tobacco Use    Smoking status: Passive Smoke Exposure - Never Smoker    Smokeless tobacco: Never    Tobacco comments:     The patient is disabled secondary to his legal blindness.  He does exercise readily about 3 times per week.   Substance Use Topics    Alcohol use: Yes     Comment: Weekends. Occasional weekdays (1-2 beers).     Family History:     Family History   Problem Relation Age of Onset    Heart disease Mother     Heart attack Mother     Diabetes Mother     Hyperlipidemia Mother     Heart disease Father     COPD Father     Heart attack Father     Hyperlipidemia Father     Heart attack Maternal Grandmother     Heart attack Maternal Grandfather     Heart attack Paternal Grandmother     Heart attack Paternal Grandfather     Hypertension Sister     Colon cancer Neg Hx      Physical Exam:   BP (!) 149/78 (BP Location: Left arm, Patient Position: Sitting, BP Method: Medium (Automatic))   Pulse (!) 44   Ht 5' 7" (1.702 m)   Wt 86.4 kg (190 lb 7.6 oz)   SpO2 100%   BMI 29.83 kg/m²        Physical Exam  Vitals and nursing note reviewed.   Constitutional:       General: He is not in acute distress.     Appearance: Normal appearance.   HENT:      Head: Normocephalic and atraumatic.      Nose: Nose normal.   Eyes:      General: No scleral icterus.     Extraocular Movements: Extraocular movements intact.      Conjunctiva/sclera: Conjunctivae normal.   Neck:      Thyroid: No thyromegaly.      Vascular: No carotid bruit or JVD.   Cardiovascular:      Rate and Rhythm: Regular rhythm. Bradycardia present.      Pulses: Normal pulses.           Radial pulses are 2+ on the right side and 2+ on the " left side.        Posterior tibial pulses are 2+ on the right side and 2+ on the left side.      Heart sounds: Normal heart sounds. No murmur heard.    No friction rub. No gallop.   Pulmonary:      Effort: Pulmonary effort is normal.      Breath sounds: Normal breath sounds. No wheezing, rhonchi or rales.   Chest:      Chest wall: No tenderness.   Abdominal:      General: Bowel sounds are normal. There is no distension.      Palpations: Abdomen is soft.      Tenderness: There is no abdominal tenderness.   Musculoskeletal:      Cervical back: Neck supple.      Right lower leg: No edema.      Left lower leg: No edema.   Skin:     General: Skin is warm and dry.      Coloration: Skin is not pale.      Findings: No erythema or rash.      Nails: There is no clubbing.   Neurological:      Mental Status: He is alert and oriented to person, place, and time.   Psychiatric:         Mood and Affect: Mood and affect normal.         Behavior: Behavior normal.        Labs:     Lab Results   Component Value Date     03/08/2023    K 4.8 03/08/2023     03/08/2023    CO2 29 03/08/2023    BUN 16 03/08/2023    CREATININE 1.1 03/08/2023    ANIONGAP 7 (L) 03/08/2023     Lab Results   Component Value Date    HGBA1C 6.0 (H) 03/08/2023     No results found for: BNP, BNPTRIAGEBLO Lab Results   Component Value Date    WBC 5.44 03/08/2023    HGB 13.7 (L) 03/08/2023    HCT 44.3 03/08/2023     03/08/2023    GRAN 2.9 01/22/2020    GRAN 60.3 01/22/2020     Lab Results   Component Value Date    CHOL 130 03/08/2023    HDL 48 03/08/2023    LDLCALC 70.2 03/08/2023    TRIG 59 03/08/2023          Imaging:   Treadmill stress test 11/1/2016    1. The EKG portion of this study is negative for ischemia at a high workload, and peak heart rate of 142 bpm (86% of predicted).   2. Exercise capacity is excellent.   3. Blood pressure response to exercise was normal (Presenting BP: 134/63 Peak BP: 210/79).   4. No significant arrhythmias were  present.   5. There were no symptoms of chest discomfort or significant dyspnea throughout the protocol.   6. The Duke treadmill score was 11 suggesting a low probability for future cardiovascular events.     [facility] 2014  The proximal LAD has a 80% stenosis. The lesion was successfully intervened. The following items were used: Stent Xience Rx 3.0x8 (JACKSON).  The LCX is normal  The mid RCA has a 90% stenosis. 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 The lesion was successfully intervened. The following items were used: Stent Xience Rx 2.75x18 (JACKSON).    EKG 1/22/2020: Sinus bradycardia, HR 41, otherwise normal     EKG 2/5/2021: Sinus bradycardia, HR 46, otherwise normal     Assessment:     1. Atherosclerosis of native coronary artery of native heart without angina pectoris    2. Primary hypertension    3. S/P PTCA (percutaneous transluminal coronary angioplasty)    4. Dyslipidemia        Plan:     CAD s/p PCI to pLAD and RCA  Asymptomatic   Continue medical management with ASA, high intensity statin, and Zetia  Continue aerobic exercise with a goal of at least 30 minutes, 5 days a week.  Would benefit from dietary modification    Dyslipidemia  At goal  Continue Lipitor 80 mg and Zetia   Mediterranean diet     Hypertension  BP has been consistently elevated at recent clinic visits.   Start Amlodipine 5 mg daily   Recommend keeping a BP log at home and sending me the readings after 2 weeks      Follow up in one year    Signed:  Vonnie Liriano PA-C  Cardiology   [phone] - Interventional  [phone] - General  3/8/2023 9:16 AM

## 2023-03-08 NOTE — PATIENT INSTRUCTIONS
Start Amlodipine 5 mg daily     Take your blood pressure each morning and evening after sitting quietly for at least 5 minutes.  Record the blood pressure, pulse, date and time (AM or PM) . After two weeks, send the the results all together to Vonnie Liriano PA-C.    Let me know if your blood pressure is staying higher than 130/80.
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
## 2023-04-25 NOTE — PROGRESS NOTES
Initial /CM Assessment/Plan of Care Note     Baseline Assessment  66 year old admitted 4/24/2023 as Inpatient with a diagnosis of small bowel obstruction.   Prior to admission patient was living with Spouse, Adult children and residing at House    .  Patient does  have a Power of  for Healthcare.  Document is not activated.  Agent is Jessica Samano.  Patient’s Primary Care Provider is Tammy Long MD.     Progress Note  Met with patient at , discuss plan. He is independent at home. He lives with spouse and a son who is going to college. He still drives. He doesn't have any home health recently.     Plan  Patient/Family Discharge Goal: Home   Is patient/family goal achievable: Yes    SW/CM - Recommendations for Discharge: Home         Barriers to Discharge  Identified Barriers to Discharge/Transition Planning: Medical necessity for acute care (NPO, electrolyte replacement, NGT clamped, colostomy care)        Anticipate patient will not need post-hospital services. Necessary services are available.  Anticipate patient can return to the environment from which patient entered the hospital.   Anticipate patient can provide self-care at discharge.    Refer to /CM Flowsheet for objective data.     Medical History  Past Medical History:   Diagnosis Date   • Acid reflux    • High cholesterol    • Hypertension    • Malignant neoplasm (CMD)     adenocarcinoma- chemo completed in early 2020. 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 male.    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 Wheeler in [facility]. He is requesting a surgical clearance for this and has paper work for Dr. Rodríguez who is seeing him after me. Because of reduced physical activity related to his injury, he has gained 10 lb.    S/P PTCA (percutaneous transluminal coronary angioplasty)  Was exercising vigorously (running on tread mill ) with no chest pain or pulmonary symptoms. Prior to knee injury. He is on ASA, atorva, zetia and fish oil per cardiology. Lipid profile is excellent:  Lab Results   Component Value Date    CHOL 147 01/22/2020    CHOL 131 12/07/2018    CHOL 154 10/18/2017     Lab Results   Component Value Date    HDL 49 01/22/2020    HDL 49 12/07/2018    HDL 49 10/18/2017     Lab Results   Component Value Date    LDLCALC 79.8 01/22/2020    LDLCALC 56.6 (L) 12/07/2018    LDLCALC 91.8 10/18/2017     Lab Results   Component Value Date    TRIG 91 01/22/2020    TRIG 127 12/07/2018    TRIG 66 10/18/2017     Lab Results   Component Value Date    CHOLHDL 33.3 01/22/2020    CHOLHDL 37.4 12/07/2018    CHOLHDL 31.8 10/18/2017       The laboratory studies were reviewed and discussed with the pt.  PSA is low and stable:  Lab Results   Component Value Date    PSA 0.40 01/22/2020    PSA 0.34 12/07/2018    PSA 0.43 10/18/2017     CBC, metabolic profile, and EKG were all normal.    Last colonoscopy was 2015 and a rescreening interval of 10 years was recommended.        Review of Systems   Constitution: Negative for chills, decreased appetite, fever, malaise/fatigue, night sweats, weight gain and weight loss.   HENT: Negative for congestion, ear pain, hearing loss, hoarse voice, sore throat and tinnitus.    Eyes: Negative for  Independent/Self, Other (comment) (wife)  Transportation: Independent/Self    Agency/Support  Type of Services Prior to Hospitalization: None  Support Systems: Spouse   Home Devices/Equipment: Blood pressure monitor, Shower chair  Mobility Assist Devices: Standard walker  Sensory Support Devices: Eyeglasses          Current Mental Status: Oriented to Reason for Hospitalization, Oriented to Time, Oriented to Place, Oriented to Person, Alert  Stressors:      Insurance  Primary: MEDICARE  Secondary: [employer]    Disposition Recommendations:  SW/CM recommendation for discharge: Home          blurred vision, redness and visual disturbance.   Cardiovascular: Negative for chest pain, leg swelling and palpitations.   Respiratory: Negative for cough, hemoptysis, shortness of breath and sputum production.    Hematologic/Lymphatic: Negative for adenopathy. Does not bruise/bleed easily.   Skin: Negative for dry skin, itching, rash and suspicious lesions.   Musculoskeletal: Negative for back pain, joint pain, myalgias and neck pain.   Gastrointestinal: Negative for abdominal pain, constipation, diarrhea, heartburn, nausea and vomiting.   Genitourinary: Negative for dysuria, flank pain, frequency, hematuria, hesitancy and urgency.   Neurological: Negative for dizziness, headaches, numbness, paresthesias and weakness.   Psychiatric/Behavioral: Negative for depression and memory loss. The patient does not have insomnia and is not nervous/anxious.      Objective:   Physical Exam   Constitutional: He is oriented to person, place, and time. He appears well-developed and well-nourished.   HENT:   Head: Normocephalic and atraumatic.   Right Ear: External ear normal.   Left Ear: External ear normal.   Mouth/Throat: Oropharynx is clear and moist.   Eyes: Pupils are equal, round, and reactive to light. Conjunctivae and EOM are normal.   Neck: Normal range of motion. Neck supple. No thyromegaly present.   Cardiovascular: Normal rate, regular rhythm and normal heart sounds.   No murmur heard.  Pulmonary/Chest: Effort normal and breath sounds normal. He has no wheezes. He has no rales.   Abdominal: Soft. Bowel sounds are normal. He exhibits no mass. There is no tenderness. There is no rebound.   Genitourinary: Rectum normal, prostate normal and penis normal.   Musculoskeletal: Normal range of motion.   Lymphadenopathy:     He has no cervical adenopathy.   Neurological: He is alert and oriented to person, place, and time.   Skin: Skin is warm and dry.   Psychiatric: He has a normal mood and affect. His behavior is normal.    Vitals reviewed.    Assessment:       1. Preventative health care    2. Acute torn meniscus of knee, right, subsequent encounter    3. S/P PTCA (percutaneous transluminal coronary angioplasty)          Plan:        1. Vaccine update: influenza and Shingrix series   2. Continue present meds and F/U with Dr. Rodríguez

## 2023-05-02 DIAGNOSIS — Z98.61 S/P PTCA (PERCUTANEOUS TRANSLUMINAL CORONARY ANGIOPLASTY): ICD-10-CM

## 2023-05-02 DIAGNOSIS — E78.5 HYPERLIPIDEMIA, UNSPECIFIED HYPERLIPIDEMIA TYPE: ICD-10-CM

## 2023-05-02 RX ORDER — EZETIMIBE 10 MG/1
10 TABLET ORAL DAILY
Qty: 90 TABLET | Refills: 3 | OUTPATIENT
Start: 2023-05-02 | End: 2024-05-01

## 2023-05-17 ENCOUNTER — HOSPITAL ENCOUNTER (OUTPATIENT)
Dept: RADIOLOGY | Facility: HOSPITAL | Age: 63
Discharge: HOME | End: 2023-05-17
Attending: FAMILY MEDICINE
Payer: MEDICARE

## 2023-05-17 DIAGNOSIS — M54.16 RADICULOPATHY, LUMBAR REGION: ICD-10-CM

## 2023-05-17 PROCEDURE — 72148 MRI LUMBAR SPINE W/O DYE: CPT | Mod: TC

## 2023-05-17 PROCEDURE — 72148 MRI LUMBAR SPINE WITHOUT CONTRAST: ICD-10-PCS | Mod: 26,,, | Performed by: RADIOLOGY

## 2023-05-17 PROCEDURE — 72148 MRI LUMBAR SPINE W/O DYE: CPT | Mod: 26,,, | Performed by: RADIOLOGY

## 2023-08-08 ENCOUNTER — TELEPHONE (OUTPATIENT)
Dept: ENDOSCOPY | Facility: HOSPITAL | Age: 63
End: 2023-08-08
Payer: MEDICARE

## 2023-08-08 NOTE — TELEPHONE ENCOUNTER
"Called to schedule the colonoscopy. Pt does not want to have the procedure. He said "maybe next year" and asked me to cancel the order.      Procedure: Ambulatory referral/consult to Endo Procedure  Status: Needs Scheduling (Sent to Patient)     Requested appt date: 3/9/2023 Authorizing: Don Coyle MD in [facility]...     Referral: [id number] (Authorized)         Expires: 9/8/2023 Priority: Routine     Assign to: ARLEEN ESPOSITO       Diagnosis: Colon cancer screening [Z12.11]      Order Specific Questions     What procedure is to be performed?     Screening Colonoscopy          CPT Code:     COLON CA SCRN NOT HI RSK IND []   "

## 2024-01-22 DIAGNOSIS — Z98.61 S/P PTCA (PERCUTANEOUS TRANSLUMINAL CORONARY ANGIOPLASTY): ICD-10-CM

## 2024-01-22 DIAGNOSIS — E78.5 HYPERLIPIDEMIA, UNSPECIFIED HYPERLIPIDEMIA TYPE: ICD-10-CM

## 2024-01-22 RX ORDER — EZETIMIBE 10 MG/1
10 TABLET ORAL DAILY
Qty: 90 TABLET | Refills: 3 | Status: SHIPPED | OUTPATIENT
Start: 2024-01-22 | End: 2024-03-20 | Stop reason: SDUPTHER

## 2024-01-24 DIAGNOSIS — R06.00 DYSPNEA, UNSPECIFIED TYPE: Primary | ICD-10-CM

## 2024-02-19 DIAGNOSIS — Z98.61 S/P PTCA (PERCUTANEOUS TRANSLUMINAL CORONARY ANGIOPLASTY): ICD-10-CM

## 2024-02-19 DIAGNOSIS — E78.5 HYPERLIPIDEMIA, UNSPECIFIED HYPERLIPIDEMIA TYPE: ICD-10-CM

## 2024-02-19 RX ORDER — AMLODIPINE BESYLATE 5 MG/1
5 TABLET ORAL DAILY
Qty: 90 TABLET | Refills: 3 | Status: SHIPPED | OUTPATIENT
Start: 2024-02-19 | End: 2024-03-20

## 2024-02-19 RX ORDER — ATORVASTATIN CALCIUM 80 MG/1
80 TABLET, FILM COATED ORAL DAILY
Qty: 90 TABLET | Refills: 3 | Status: SHIPPED | OUTPATIENT
Start: 2024-02-19 | End: 2024-03-20

## 2024-03-19 NOTE — PROGRESS NOTES
General Cardiology Clinic Note  Reason for Visit: Annual follow up  Last Clinic Visit: 3/8/2023  General Cardiologist: Dr. Rodríguez     HPI:   Yoel Martinez is a 63 y.o. male who presents for annual follow up.     Problems:  CAD s/p PCI/JACKSON to pLAD and mRCA 11/2014  Hypertension  Dyslipidemia  Pre-diabetes    Interval HPI:   Patient presents for annual follow up. Feels great. He denies symptoms of CAD, CHF, arrhythmia, hypotension, TIA, claudication. Exercises 4 days a week at the gym. Only complaint continues to be of chronic back pain. He did switch from Ibuprofen to Tylenol to control the pain. Not checking BP at home.     3/8/2023 HPI  Patient presents for annual follow up. He denies symptoms of CAD, CHF, arrhythmia, hypotension, TIA, claudication. He exercises at least three times a week at the gym; he uses the treadmill or elliptical. He has chronic muscle cramps for many years; he plans to start using CoQ10. He also reports chronic back pain for which he has started taking Ibuprofen regularly. He was advised by his PCP today to switch to Tylenol instead.     2/5/2021 HPI (me)  Patient with asymptomatic (diagnosed by ETT, but may have had some exertional dyspnea) coronary artery disease s/p PCI on 24-Nov-2014 with drug eluting with stents placed to proximal LAD and mid RCA presents for one year follow up.       Patient denies any chest discomfort on exertion or at rest. Patient denies any dyspnea at rest or on exertion, orthopnea, PND, or edema.  Patient denies any palpitations, lightheadedness, or syncope. He goes to the gym at least 4 days a week for about 2.5 hours. He had his meniscus surgery nearly a year ago, but has only been back jogging for a couple months and does not think he will ever be able to run like he did before the surgery. He will also use the elliptical and do strengthening exercises. He admits that his diet is not heart healthy; he enjoys classic high sodium cajun food and drinks  beer.     ROS:      Review of Systems   Constitutional: Negative for diaphoresis, malaise/fatigue, weight gain and weight loss.   HENT:  Negative for nosebleeds.    Eyes:  Negative for vision loss in left eye, vision loss in right eye and visual disturbance.   Cardiovascular:  Negative for chest pain, claudication, dyspnea on exertion, irregular heartbeat, leg swelling, near-syncope, orthopnea, palpitations, paroxysmal nocturnal dyspnea and syncope.   Respiratory:  Negative for cough, shortness of breath, sleep disturbances due to breathing, snoring and wheezing.    Hematologic/Lymphatic: Negative for bleeding problem. Does not bruise/bleed easily.   Skin:  Negative for poor wound healing and rash.   Musculoskeletal:  Positive for back pain and joint pain. Negative for muscle cramps and myalgias.   Gastrointestinal:  Negative for bloating, abdominal pain, diarrhea, heartburn, melena, nausea and vomiting.   Genitourinary:  Negative for hematuria and nocturia.   Neurological:  Negative for brief paralysis, dizziness, headaches, light-headedness, numbness and weakness.   Psychiatric/Behavioral:  Negative for depression.    Allergic/Immunologic: Negative for hives.       PMH:     Past Medical History:   Diagnosis Date    Coronary artery disease     Stents 2014.    Degenerative joint disease (DJD) of lumbar spine     Hyperlipidemia     Macular degeneration     Legally blind    Muscle cramps     Prediabetes     Primary hypertension 03/08/2023    Stargardt's disease     Subclinical hypothyroidism      Past Surgical History:   Procedure Laterality Date    CARDIAC CATHETERIZATION  11/25/14    Successful 2vs JACKSON for LAD and RCA disease    COLONOSCOPY      Normal at age 52 with plan for 10 yr f/u.    Left thigh tumor      Excision of calcifications.    REPAIR OF MENISCUS OF KNEE Right     TONSILLECTOMY       Allergies:     Review of patient's allergies indicates:   Allergen Reactions    Penicillins Rash     Medications:  "    Current Outpatient Medications on File Prior to Visit   Medication Sig Dispense Refill    amLODIPine (NORVASC) 5 MG tablet Take 1 tablet (5 mg total) by mouth once daily. 90 tablet 3    aspirin (ECOTRIN) 81 MG EC tablet Take 81 mg by mouth once daily.      atorvastatin (LIPITOR) 80 MG tablet Take 1 tablet (80 mg total) by mouth once daily. 90 tablet 3    cholecalciferol, vitamin D3, (VITAMIN D3) 50 mcg (2,000 unit) Cap Take 5,000 Units by mouth once daily.      ezetimibe (ZETIA) 10 mg tablet Take 1 tablet (10 mg total) by mouth once daily. 90 tablet 3    omega-3 fatty acids-vitamin E 1,000 mg Cap Take 2,400 mg by mouth 2 (two) times a day.      potassium 99 mg Tab Take by mouth once daily.      saw palmetto 160 MG capsule Take 450 mg by mouth once daily.        No current facility-administered medications on file prior to visit.     Social History:     Social History     Tobacco Use    Smoking status: Never     Passive exposure: Yes    Smokeless tobacco: Never    Tobacco comments:     The patient is disabled secondary to his legal blindness.  He does exercise readily about 3 times per week.   Substance Use Topics    Alcohol use: Yes     Comment: Most days. 1 to 2 beers most days, sometimes 6 if social.     Family History:     Family History   Problem Relation Age of Onset    Heart disease Mother     Heart attack Mother     Diabetes Mother     Hyperlipidemia Mother     Heart disease Father     COPD Father     Heart attack Father     Hyperlipidemia Father     Heart attack Maternal Grandmother     Heart attack Maternal Grandfather     Heart attack Paternal Grandmother     Heart attack Paternal Grandfather     Hypertension Sister     Colon cancer Neg Hx      Physical Exam:   /78   Pulse (!) 49   Ht 5' 7" (1.702 m)   Wt 88.2 kg (194 lb 7.1 oz)   SpO2 97%   BMI 30.45 kg/m²        Physical Exam  Vitals and nursing note reviewed.   Constitutional:       General: He is not in acute distress.     Appearance: " "Normal appearance.   HENT:      Head: Normocephalic and atraumatic.      Nose: Nose normal.   Eyes:      General: No scleral icterus.     Extraocular Movements: Extraocular movements intact.      Conjunctiva/sclera: Conjunctivae normal.   Neck:      Thyroid: No thyromegaly.      Vascular: No carotid bruit or JVD.   Cardiovascular:      Rate and Rhythm: Regular rhythm. Bradycardia present.      Pulses: Normal pulses.           Radial pulses are 2+ on the right side and 2+ on the left side.        Posterior tibial pulses are 2+ on the right side and 2+ on the left side.      Heart sounds: Normal heart sounds. No murmur heard.     No friction rub. No gallop.   Pulmonary:      Effort: Pulmonary effort is normal.      Breath sounds: Normal breath sounds. No wheezing, rhonchi or rales.   Chest:      Chest wall: No tenderness.   Abdominal:      General: Bowel sounds are normal. There is no distension.      Palpations: Abdomen is soft.      Tenderness: There is no abdominal tenderness.   Musculoskeletal:      Cervical back: Neck supple.      Right lower leg: No edema.      Left lower leg: No edema.   Skin:     General: Skin is warm and dry.      Coloration: Skin is not pale.      Findings: No erythema or rash.      Nails: There is no clubbing.   Neurological:      Mental Status: He is alert and oriented to person, place, and time.   Psychiatric:         Mood and Affect: Mood and affect normal.         Behavior: Behavior normal.          Labs:     Lab Results   Component Value Date     03/20/2024    K 4.6 03/20/2024     03/20/2024    CO2 27 03/20/2024    BUN 13 03/20/2024    CREATININE 1.0 03/20/2024    ANIONGAP 10 03/20/2024     Lab Results   Component Value Date    HGBA1C 6.1 (H) 03/20/2024     No results found for: "BNP", "BNPTRIAGEBLO" Lab Results   Component Value Date    WBC 5.45 03/20/2024    HGB 14.2 03/20/2024    HCT 45.3 03/20/2024     03/20/2024    GRAN 2.9 01/22/2020    GRAN 60.3 01/22/2020 "     Lab Results   Component Value Date    CHOL 140 03/20/2024    HDL 43 03/20/2024    LDLCALC 74.8 03/20/2024    TRIG 111 03/20/2024          Imaging:   Treadmill stress test 11/1/2016    1. The EKG portion of this study is negative for ischemia at a high workload, and peak heart rate of 142 bpm (86% of predicted).   2. Exercise capacity is excellent.   3. Blood pressure response to exercise was normal (Presenting BP: 134/63 Peak BP: 210/79).   4. No significant arrhythmias were present.   5. There were no symptoms of chest discomfort or significant dyspnea throughout the protocol.   6. The Duke treadmill score was 11 suggesting a low probability for future cardiovascular events.     [facility] 2014  The proximal LAD has a 80% stenosis. The lesion was successfully intervened. The following items were used: Stent Xience Rx 3.0x8 (JACKSON).  The LCX is normal  The mid RCA has a 90% stenosis. 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 The lesion was successfully intervened. The following items were used: Stent Xience Rx 2.75x18 (JACKSON).    EKG 1/22/2020: Sinus bradycardia, HR 41, otherwise normal     EKG 2/5/2021: Sinus bradycardia, HR 46, otherwise normal     Assessment:     1. Atherosclerosis of native coronary artery of native heart without angina pectoris    2. S/P PTCA (percutaneous transluminal coronary angioplasty)    3. Dyslipidemia    4. Primary hypertension      Plan:     CAD s/p PCI to pLAD and RCA  Asymptomatic   Continue medical management with ASA, high intensity statin, and Zetia  Continue aerobic exercise with a goal of at least 30 minutes, 5 days a week.  Would benefit from dietary modification    Dyslipidemia  LDL above goal.   Switch Lipitor to Crestor 40. Obtain lipid panel and LFT in 3 months.   Continue Zetia 10 mg daily   Mediterranean diet     Hypertension  Mildly elevated  Increase Amlodipine to 10 mg daily   Start home BP log       Follow up in one year    Signed:  Vonnie  PHAM Liriano  Cardiology   [phone] - Interventional  [phone] - General  3/19/2024 9:16 AM

## 2024-03-20 ENCOUNTER — HOSPITAL ENCOUNTER (OUTPATIENT)
Dept: CARDIOLOGY | Facility: CLINIC | Age: 64
Discharge: HOME | End: 2024-03-20
Payer: MEDICARE

## 2024-03-20 ENCOUNTER — OFFICE VISIT (OUTPATIENT)
Dept: INTERNAL MEDICINE | Facility: CLINIC | Age: 64
End: 2024-03-20
Payer: MEDICARE

## 2024-03-20 ENCOUNTER — CLINICAL SUPPORT (OUTPATIENT)
Dept: INTERNAL MEDICINE | Facility: CLINIC | Age: 64
End: 2024-03-20
Payer: MEDICARE

## 2024-03-20 ENCOUNTER — OFFICE VISIT (OUTPATIENT)
Dept: CARDIOLOGY | Facility: CLINIC | Age: 64
End: 2024-03-20
Payer: MEDICARE

## 2024-03-20 VITALS
WEIGHT: 195.31 LBS | BODY MASS INDEX: 30.65 KG/M2 | DIASTOLIC BLOOD PRESSURE: 74 MMHG | HEART RATE: 47 BPM | HEIGHT: 67 IN | SYSTOLIC BLOOD PRESSURE: 137 MMHG

## 2024-03-20 VITALS
BODY MASS INDEX: 30.52 KG/M2 | OXYGEN SATURATION: 97 % | DIASTOLIC BLOOD PRESSURE: 78 MMHG | SYSTOLIC BLOOD PRESSURE: 139 MMHG | HEIGHT: 67 IN | HEART RATE: 49 BPM | WEIGHT: 194.44 LBS

## 2024-03-20 DIAGNOSIS — E78.5 DYSLIPIDEMIA: ICD-10-CM

## 2024-03-20 DIAGNOSIS — R06.00 DYSPNEA, UNSPECIFIED TYPE: ICD-10-CM

## 2024-03-20 DIAGNOSIS — I25.10 ATHEROSCLEROSIS OF NATIVE CORONARY ARTERY OF NATIVE HEART WITHOUT ANGINA PECTORIS: Primary | ICD-10-CM

## 2024-03-20 DIAGNOSIS — R73.03 PRE-DIABETES: ICD-10-CM

## 2024-03-20 DIAGNOSIS — I10 PRIMARY HYPERTENSION: ICD-10-CM

## 2024-03-20 DIAGNOSIS — Z00.00 ENCOUNTER FOR ANNUAL HEALTH EXAMINATION: Primary | ICD-10-CM

## 2024-03-20 DIAGNOSIS — E78.5 HYPERLIPIDEMIA, UNSPECIFIED HYPERLIPIDEMIA TYPE: ICD-10-CM

## 2024-03-20 DIAGNOSIS — Z00.00 ANNUAL PHYSICAL EXAM: Primary | ICD-10-CM

## 2024-03-20 DIAGNOSIS — Z98.61 S/P PTCA (PERCUTANEOUS TRANSLUMINAL CORONARY ANGIOPLASTY): ICD-10-CM

## 2024-03-20 LAB
ALBUMIN SERPL BCP-MCNC: 4 G/DL (ref 3.5–5.2)
ALP SERPL-CCNC: 80 U/L (ref 55–135)
ALT SERPL W/O P-5'-P-CCNC: 52 U/L (ref 10–44)
ANION GAP SERPL CALC-SCNC: 10 MMOL/L (ref 8–16)
AST SERPL-CCNC: 37 U/L (ref 10–40)
BILIRUB SERPL-MCNC: 0.9 MG/DL (ref 0.1–1)
BUN SERPL-MCNC: 13 MG/DL (ref 8–23)
CALCIUM SERPL-MCNC: 9.8 MG/DL (ref 8.7–10.5)
CHLORIDE SERPL-SCNC: 108 MMOL/L (ref 95–110)
CHOLEST SERPL-MCNC: 140 MG/DL (ref 120–199)
CHOLEST/HDLC SERPL: 3.3 {RATIO} (ref 2–5)
CO2 SERPL-SCNC: 27 MMOL/L (ref 23–29)
COMPLEXED PSA SERPL-MCNC: 0.39 NG/ML (ref 0–4)
CREAT SERPL-MCNC: 1 MG/DL (ref 0.5–1.4)
ERYTHROCYTE [DISTWIDTH] IN BLOOD BY AUTOMATED COUNT: 13.8 % (ref 11.5–14.5)
EST. GFR  (NO RACE VARIABLE): >60 ML/MIN/1.73 M^2
ESTIMATED AVG GLUCOSE: 128 MG/DL (ref 68–131)
GLUCOSE SERPL-MCNC: 123 MG/DL (ref 70–110)
HBA1C MFR BLD: 6.1 % (ref 4–5.6)
HCT VFR BLD AUTO: 45.3 % (ref 40–54)
HDLC SERPL-MCNC: 43 MG/DL (ref 40–75)
HDLC SERPL: 30.7 % (ref 20–50)
HGB BLD-MCNC: 14.2 G/DL (ref 14–18)
LDLC SERPL CALC-MCNC: 74.8 MG/DL (ref 63–159)
MCH RBC QN AUTO: 31.6 PG (ref 27–31)
MCHC RBC AUTO-ENTMCNC: 31.3 G/DL (ref 32–36)
MCV RBC AUTO: 101 FL (ref 82–98)
NONHDLC SERPL-MCNC: 97 MG/DL
OHS QRS DURATION: 90 MS
OHS QTC CALCULATION: 394 MS
PLATELET # BLD AUTO: 233 K/UL (ref 150–450)
PMV BLD AUTO: 10.2 FL (ref 9.2–12.9)
POTASSIUM SERPL-SCNC: 4.6 MMOL/L (ref 3.5–5.1)
PROT SERPL-MCNC: 7.2 G/DL (ref 6–8.4)
RBC # BLD AUTO: 4.49 M/UL (ref 4.6–6.2)
SODIUM SERPL-SCNC: 145 MMOL/L (ref 136–145)
TRIGL SERPL-MCNC: 111 MG/DL (ref 30–150)
TSH SERPL DL<=0.005 MIU/L-ACNC: 3.97 UIU/ML (ref 0.4–4)
WBC # BLD AUTO: 5.45 K/UL (ref 3.9–12.7)

## 2024-03-20 PROCEDURE — 80061 LIPID PANEL: CPT | Performed by: INTERNAL MEDICINE

## 2024-03-20 PROCEDURE — 85027 COMPLETE CBC AUTOMATED: CPT | Performed by: INTERNAL MEDICINE

## 2024-03-20 PROCEDURE — 99213 OFFICE O/P EST LOW 20 MIN: CPT | Mod: PBBFAC,27 | Performed by: INTERNAL MEDICINE

## 2024-03-20 PROCEDURE — 99396 PREV VISIT EST AGE 40-64: CPT | Mod: S$PBB,,, | Performed by: INTERNAL MEDICINE

## 2024-03-20 PROCEDURE — 99999 PR PBB SHADOW E&M-EST. PATIENT-LVL IV: CPT | Mod: PBBFAC,,, | Performed by: PHYSICIAN ASSISTANT

## 2024-03-20 PROCEDURE — 99999 PR PBB SHADOW E&M-EST. PATIENT-LVL III: CPT | Mod: PBBFAC,,, | Performed by: INTERNAL MEDICINE

## 2024-03-20 PROCEDURE — 93005 ELECTROCARDIOGRAM TRACING: CPT | Mod: S$GLB,,, | Performed by: INTERNAL MEDICINE

## 2024-03-20 PROCEDURE — 99214 OFFICE O/P EST MOD 30 MIN: CPT | Mod: PBBFAC | Performed by: PHYSICIAN ASSISTANT

## 2024-03-20 PROCEDURE — 84443 ASSAY THYROID STIM HORMONE: CPT | Performed by: INTERNAL MEDICINE

## 2024-03-20 PROCEDURE — 93010 ELECTROCARDIOGRAM REPORT: CPT | Mod: S$GLB,,, | Performed by: INTERNAL MEDICINE

## 2024-03-20 PROCEDURE — 84153 ASSAY OF PSA TOTAL: CPT | Performed by: INTERNAL MEDICINE

## 2024-03-20 PROCEDURE — 99214 OFFICE O/P EST MOD 30 MIN: CPT | Mod: S$PBB,,, | Performed by: PHYSICIAN ASSISTANT

## 2024-03-20 PROCEDURE — 80053 COMPREHEN METABOLIC PANEL: CPT | Performed by: INTERNAL MEDICINE

## 2024-03-20 PROCEDURE — 83036 HEMOGLOBIN GLYCOSYLATED A1C: CPT | Performed by: INTERNAL MEDICINE

## 2024-03-20 RX ORDER — AMLODIPINE BESYLATE 10 MG/1
10 TABLET ORAL DAILY
Qty: 90 TABLET | Refills: 3 | Status: SHIPPED | OUTPATIENT
Start: 2024-03-20 | End: 2025-03-20

## 2024-03-20 RX ORDER — ROSUVASTATIN CALCIUM 40 MG/1
40 TABLET, COATED ORAL NIGHTLY
Qty: 90 TABLET | Refills: 3 | Status: SHIPPED | OUTPATIENT
Start: 2024-03-20 | End: 2025-03-20

## 2024-03-20 RX ORDER — EZETIMIBE 10 MG/1
10 TABLET ORAL DAILY
Qty: 90 TABLET | Refills: 3 | Status: SHIPPED | OUTPATIENT
Start: 2024-03-20 | End: 2025-03-20

## 2024-03-20 NOTE — PROGRESS NOTES
Subjective:       Patient ID: Yoel Martinez is a 63 y.o. male.    Chief Complaint: Executive Health      HPI  Annual health exam. Reviewed medical, surgical, social and family history, medications, appropriate preventive health screenings, as well as vaccination history. Updates as noted below or in assessment and plan.    Weight still up but stable. Goes to gym 4x weekly usually. Does have a couple beers most nights, sometimes a few. Diet does fluctuate still. Taking prescribed meds, including amlodipine added for bp by Cards last yr.  Lumbar DJD hx. Still gets lbp but no sciatica. Has stopped ibuprofen and uses Tylenol prn as previously discussed.  Muscle cramps still. CoQ10 didn't do much but pickle juice seems to work reliably.    Review of Systems   All other systems reviewed and are negative.      Past Medical History:   Diagnosis Date    Coronary artery disease     Stents 2014.    Degenerative joint disease (DJD) of lumbar spine     Hyperlipidemia     Macular degeneration     Legally blind    Muscle cramps     Prediabetes     Primary hypertension 03/08/2023    Stargardt's disease     Subclinical hypothyroidism          Current Outpatient Medications:     amLODIPine (NORVASC) 5 MG tablet, Take 1 tablet (5 mg total) by mouth once daily., Disp: 90 tablet, Rfl: 3    aspirin (ECOTRIN) 81 MG EC tablet, Take 81 mg by mouth once daily., Disp: , Rfl:     cholecalciferol, vitamin D3, (VITAMIN D3) 50 mcg (2,000 unit) Cap, Take 5,000 Units by mouth once daily., Disp: , Rfl:     ezetimibe (ZETIA) 10 mg tablet, Take 1 tablet (10 mg total) by mouth once daily., Disp: 90 tablet, Rfl: 3    omega-3 fatty acids-vitamin E 1,000 mg Cap, Take 2,400 mg by mouth 2 (two) times a day., Disp: , Rfl:     saw palmetto 160 MG capsule, Take 450 mg by mouth once daily. , Disp: , Rfl:     potassium 99 mg Tab, Take by mouth once daily., Disp: , Rfl:     Past Surgical History:   Procedure Laterality Date    CARDIAC CATHETERIZATION  11/25/14     Successful 2vs [facility] for LAD and RCA disease    COLONOSCOPY      Normal at age 52 with plan for 10 yr f/u.    Left thigh tumor      Excision of calcifications.    REPAIR OF MENISCUS OF KNEE Right     TONSILLECTOMY         Family History   Problem Relation Age of Onset    Heart disease Mother     Heart attack Mother     Diabetes Mother     Hyperlipidemia Mother     Heart disease Father     COPD Father     Heart attack Father     Hyperlipidemia Father     Heart attack Maternal Grandmother     Heart attack Maternal Grandfather     Heart attack Paternal Grandmother     Heart attack Paternal Grandfather     Hypertension Sister     Colon cancer Neg Hx        Social History     Tobacco Use    Smoking status: Never     Passive exposure: Yes    Smokeless tobacco: Never    Tobacco comments:     The patient is disabled secondary to his legal blindness.  He does exercise readily about 3 times per week.   Substance Use Topics    Alcohol use: Yes     Comment: Most days. 1 to 2 beers most days, sometimes 6 if social.    Drug use: No       Immunization History   Administered Date(s) Administered    COVID-19, MRNA, LN-S, PF (Pfizer) (Purple Cap) 04/01/2021, 04/21/2021, 01/05/2022    Influenza - Quadrivalent 10/01/2015    Influenza - Quadrivalent - PF *Preferred* (6 months and older) 10/06/2014, 01/22/2020, 02/05/2021    Tdap 09/25/2015    Zoster Recombinant 01/22/2020, 10/13/2020         Objective:      Vitals:    03/20/24 0938   BP: 137/74   Pulse: (!) 47       Physical Exam  Constitutional:       General: He is not in acute distress.     Appearance: Normal appearance. He is well-developed. He is not ill-appearing.   HENT:      Head: Normocephalic and atraumatic.      Right Ear: Hearing and tympanic membrane normal. There is no impacted cerumen.      Left Ear: Hearing and tympanic membrane normal. There is no impacted cerumen.      Nose: Nose normal.      Mouth/Throat:      Mouth: Mucous membranes are moist.      Pharynx:  Oropharynx is clear.   Eyes:      Extraocular Movements: Extraocular movements intact.      Conjunctiva/sclera: Conjunctivae normal.      Pupils: Pupils are equal, round, and reactive to light.   Neck:      Vascular: No carotid bruit.   Cardiovascular:      Rate and Rhythm: Normal rate and regular rhythm.      Heart sounds: Normal heart sounds. No murmur heard.  Pulmonary:      Effort: Pulmonary effort is normal. No respiratory distress.      Breath sounds: Normal breath sounds. No wheezing, rhonchi or rales.   Abdominal:      General: Abdomen is flat. There is no distension.      Palpations: Abdomen is soft. There is no mass.      Tenderness: There is no abdominal tenderness.      Hernia: No hernia is present.   Musculoskeletal:         General: No swelling or deformity. Normal range of motion.      Cervical back: No tenderness.      Right lower leg: No edema.      Left lower leg: No edema.   Lymphadenopathy:      Cervical: No cervical adenopathy.   Skin:     General: Skin is warm and dry.      Findings: No lesion or rash.   Neurological:      General: No focal deficit present.      Mental Status: He is alert and oriented to person, place, and time.      Cranial Nerves: No cranial nerve deficit.      Coordination: Coordination normal.      Gait: Gait normal.      Deep Tendon Reflexes: Reflexes normal.   Psychiatric:         Mood and Affect: Mood normal.         Behavior: Behavior normal.         Thought Content: Thought content normal.         Judgment: Judgment normal.         Recent Results (from the past 2016 hour(s))   Comprehensive metabolic panel    Collection Time: 03/20/24  9:07 AM   Result Value Ref Range    Sodium 145 136 - 145 mmol/L    Potassium 4.6 3.5 - 5.1 mmol/L    Chloride 108 95 - 110 mmol/L    CO2 27 23 - 29 mmol/L    Glucose 123 (H) 70 - 110 mg/dL    BUN 13 8 - 23 mg/dL    Creatinine 1.0 0.5 - 1.4 mg/dL    Calcium 9.8 8.7 - 10.5 mg/dL    Total Protein 7.2 6.0 - 8.4 g/dL    Albumin 4.0 3.5 - 5.2  g/dL    Total Bilirubin 0.9 0.1 - 1.0 mg/dL    Alkaline Phosphatase 80 55 - 135 U/L    AST 37 10 - 40 U/L    ALT 52 (H) 10 - 44 U/L    eGFR >60.0 >60 mL/min/1.73 m^2    Anion Gap 10 8 - 16 mmol/L   CBC Without Differential    Collection Time: 03/20/24  9:07 AM   Result Value Ref Range    WBC 5.45 3.90 - 12.70 K/uL    RBC 4.49 (L) 4.60 - 6.20 M/uL    Hemoglobin 14.2 14.0 - 18.0 g/dL    Hematocrit 45.3 40.0 - 54.0 %     (H) 82 - 98 fL    MCH 31.6 (H) 27.0 - 31.0 pg    MCHC 31.3 (L) 32.0 - 36.0 g/dL    RDW 13.8 11.5 - 14.5 %    Platelets 233 150 - 450 K/uL    MPV 10.2 9.2 - 12.9 fL   Lipid panel    Collection Time: 03/20/24  9:07 AM   Result Value Ref Range    Cholesterol 140 120 - 199 mg/dL    Triglycerides 111 30 - 150 mg/dL    HDL 43 40 - 75 mg/dL    LDL Cholesterol 74.8 63.0 - 159.0 mg/dL    HDL/Cholesterol Ratio 30.7 20.0 - 50.0 %    Total Cholesterol/HDL Ratio 3.3 2.0 - 5.0    Non-HDL Cholesterol 97 mg/dL   TSH    Collection Time: 03/20/24  9:07 AM   Result Value Ref Range    TSH 3.971 0.400 - 4.000 uIU/mL   PSA, Screening (every year)    Collection Time: 03/20/24  9:07 AM   Result Value Ref Range    PSA, Screen 0.39 0.00 - 4.00 ng/mL   Hemoglobin A1c    Collection Time: 03/20/24  9:07 AM   Result Value Ref Range    Hemoglobin A1C 6.1 (H) 4.0 - 5.6 %    Estimated Avg Glucose 128 68 - 131 mg/dL          Assessment/Plan:     1) Annual wellness exam  2) Elevated BMI, Prediabetes, Hyperlipidemia/CAD, Hypertension, Elevated ALT  - Discussed wt loss, calorie restriction, exercise. Moderate alcohol intake. Prediabetes stable but persistent, LDL slightly above goal on Lipitor 80 and Zetia, ALT elevation likely reflecting some degree of fatty liver. Plan to repeat lipids, A1c, and CMP in 6 to 12 mths. Taking daily aspirin. Seeing Cardiology for f/u today. BP high normal on Amlodipine 5 daily.  3) Subclinical hypothyroidism - Normal TSH today. Repeat 6 to 12 mths.  4) Lumbar DJD - Stable without radiculopathy  currently. Continue Tylenol 1000 twice daily as needed. Avoid NSAID's given CAD history.    - Vaccines reviewed.  - Wasn't able to get screening colonoscopy last year. Last scope at age 52. His wife will message me the preferred facility to fax request to in [place] soon.  - PSA normal. Consider repeat screening 1 yr.  - Sees Derm for skin screening.

## 2024-03-21 ENCOUNTER — PATIENT MESSAGE (OUTPATIENT)
Dept: INTERNAL MEDICINE | Facility: CLINIC | Age: 64
End: 2024-03-21
Payer: MEDICARE

## 2024-03-21 DIAGNOSIS — Z12.11 COLON CANCER SCREENING: Primary | ICD-10-CM

## 2025-02-12 DIAGNOSIS — R06.00 DYSPNEA, UNSPECIFIED TYPE: Primary | ICD-10-CM

## 2025-03-03 RX ORDER — AMLODIPINE BESYLATE 10 MG/1
10 TABLET ORAL DAILY
Qty: 90 TABLET | Refills: 3 | Status: SHIPPED | OUTPATIENT
Start: 2025-03-03 | End: 2026-03-03

## 2025-03-24 RX ORDER — ROSUVASTATIN CALCIUM 40 MG/1
40 TABLET, COATED ORAL NIGHTLY
Qty: 90 TABLET | Refills: 3 | Status: SHIPPED | OUTPATIENT
Start: 2025-03-24 | End: 2026-03-24

## 2025-03-25 ENCOUNTER — HOSPITAL ENCOUNTER (OUTPATIENT)
Dept: RADIOLOGY | Facility: HOSPITAL | Age: 65
Discharge: HOME | End: 2025-03-25
Attending: EMERGENCY MEDICINE
Payer: COMMERCIAL

## 2025-03-25 ENCOUNTER — RESULTS FOLLOW-UP (OUTPATIENT)
Dept: INTERNAL MEDICINE | Facility: CLINIC | Age: 65
End: 2025-03-25

## 2025-03-25 ENCOUNTER — OFFICE VISIT (OUTPATIENT)
Dept: INTERNAL MEDICINE | Facility: CLINIC | Age: 65
End: 2025-03-25
Payer: COMMERCIAL

## 2025-03-25 ENCOUNTER — OFFICE VISIT (OUTPATIENT)
Dept: CARDIOLOGY | Facility: CLINIC | Age: 65
End: 2025-03-25
Payer: COMMERCIAL

## 2025-03-25 ENCOUNTER — CLINICAL SUPPORT (OUTPATIENT)
Dept: INTERNAL MEDICINE | Facility: CLINIC | Age: 65
End: 2025-03-25
Payer: COMMERCIAL

## 2025-03-25 ENCOUNTER — HOSPITAL ENCOUNTER (OUTPATIENT)
Dept: CARDIOLOGY | Facility: CLINIC | Age: 65
Discharge: HOME | End: 2025-03-25
Payer: MEDICARE

## 2025-03-25 VITALS
HEIGHT: 67 IN | BODY MASS INDEX: 29.19 KG/M2 | HEART RATE: 49 BPM | WEIGHT: 186 LBS | OXYGEN SATURATION: 98 % | DIASTOLIC BLOOD PRESSURE: 84 MMHG | SYSTOLIC BLOOD PRESSURE: 132 MMHG

## 2025-03-25 VITALS
OXYGEN SATURATION: 94 % | WEIGHT: 192.44 LBS | HEIGHT: 67 IN | SYSTOLIC BLOOD PRESSURE: 130 MMHG | DIASTOLIC BLOOD PRESSURE: 72 MMHG | HEART RATE: 57 BPM | BODY MASS INDEX: 30.2 KG/M2

## 2025-03-25 DIAGNOSIS — Z91.89 AT HIGH RISK FOR HYPERKALEMIA: ICD-10-CM

## 2025-03-25 DIAGNOSIS — R06.00 DYSPNEA, UNSPECIFIED TYPE: ICD-10-CM

## 2025-03-25 DIAGNOSIS — E03.8 SUBCLINICAL HYPOTHYROIDISM: ICD-10-CM

## 2025-03-25 DIAGNOSIS — Z98.61 S/P PTCA (PERCUTANEOUS TRANSLUMINAL CORONARY ANGIOPLASTY): ICD-10-CM

## 2025-03-25 DIAGNOSIS — I25.118 CORONARY ARTERY DISEASE OF NATIVE ARTERY OF NATIVE HEART WITH STABLE ANGINA PECTORIS: ICD-10-CM

## 2025-03-25 DIAGNOSIS — I25.10 ATHEROSCLEROSIS OF NATIVE CORONARY ARTERY OF NATIVE HEART WITHOUT ANGINA PECTORIS: Primary | ICD-10-CM

## 2025-03-25 DIAGNOSIS — Z78.9 ALCOHOL USE: ICD-10-CM

## 2025-03-25 DIAGNOSIS — R00.1 SINUS BRADYCARDIA ON ECG: ICD-10-CM

## 2025-03-25 DIAGNOSIS — E78.5 DYSLIPIDEMIA: ICD-10-CM

## 2025-03-25 DIAGNOSIS — Z12.11 SCREENING FOR COLON CANCER: ICD-10-CM

## 2025-03-25 DIAGNOSIS — I10 PRIMARY HYPERTENSION: ICD-10-CM

## 2025-03-25 DIAGNOSIS — R73.03 PREDIABETES: ICD-10-CM

## 2025-03-25 DIAGNOSIS — Z00.00 ANNUAL PHYSICAL EXAM: Primary | ICD-10-CM

## 2025-03-25 DIAGNOSIS — Z00.00 ENCOUNTER FOR SCREENING AND PREVENTATIVE CARE: Primary | ICD-10-CM

## 2025-03-25 DIAGNOSIS — E78.5 HYPERLIPIDEMIA, UNSPECIFIED HYPERLIPIDEMIA TYPE: ICD-10-CM

## 2025-03-25 DIAGNOSIS — E80.6 HYPERBILIRUBINEMIA: ICD-10-CM

## 2025-03-25 LAB
ALBUMIN SERPL BCP-MCNC: 4.3 G/DL (ref 3.5–5.2)
ALP SERPL-CCNC: 60 UNIT/L (ref 40–150)
ALT SERPL W/O P-5'-P-CCNC: 28 UNIT/L (ref 10–44)
ANION GAP (OHS): 11 MMOL/L (ref 8–16)
ANION GAP (OHS): 9 MMOL/L (ref 8–16)
AST SERPL-CCNC: 29 UNIT/L (ref 11–45)
BILIRUB SERPL-MCNC: 1.4 MG/DL (ref 0.1–1)
BUN SERPL-MCNC: 14 MG/DL (ref 8–23)
BUN SERPL-MCNC: 15 MG/DL (ref 8–23)
CALCIUM SERPL-MCNC: 9.9 MG/DL (ref 8.7–10.5)
CALCIUM SERPL-MCNC: 9.9 MG/DL (ref 8.7–10.5)
CHLORIDE SERPL-SCNC: 105 MMOL/L (ref 95–110)
CHLORIDE SERPL-SCNC: 106 MMOL/L (ref 95–110)
CHOLEST SERPL-MCNC: 136 MG/DL (ref 120–199)
CHOLEST/HDLC SERPL: 2.7 {RATIO} (ref 2–5)
CO2 SERPL-SCNC: 27 MMOL/L (ref 23–29)
CO2 SERPL-SCNC: 27 MMOL/L (ref 23–29)
CREAT SERPL-MCNC: 0.8 MG/DL (ref 0.5–1.4)
CREAT SERPL-MCNC: 0.9 MG/DL (ref 0.5–1.4)
EAG (OHS): 128 MG/DL (ref 68–131)
ERYTHROCYTE [DISTWIDTH] IN BLOOD BY AUTOMATED COUNT: 13.5 % (ref 11.5–14.5)
GFR SERPLBLD CREATININE-BSD FMLA CKD-EPI: >60 ML/MIN/1.73/M2
GFR SERPLBLD CREATININE-BSD FMLA CKD-EPI: >60 ML/MIN/1.73/M2
GLUCOSE SERPL-MCNC: 113 MG/DL (ref 70–110)
GLUCOSE SERPL-MCNC: 115 MG/DL (ref 70–110)
HBA1C MFR BLD: 6.1 % (ref 4–5.6)
HCT VFR BLD AUTO: 45.9 % (ref 40–54)
HDLC SERPL-MCNC: 50 MG/DL (ref 40–75)
HDLC SERPL: 36.8 % (ref 20–50)
HGB BLD-MCNC: 14.4 GM/DL (ref 14–18)
LDLC SERPL CALC-MCNC: 57.6 MG/DL (ref 63–159)
MCH RBC QN AUTO: 30.5 PG (ref 27–50)
MCHC RBC AUTO-ENTMCNC: 31.4 G/DL (ref 32–36)
MCV RBC AUTO: 97 FL (ref 82–98)
NONHDLC SERPL-MCNC: 86 MG/DL
OHS QRS DURATION: 98 MS
OHS QTC CALCULATION: 426 MS
PLATELET # BLD AUTO: 230 K/UL (ref 150–450)
PMV BLD AUTO: 10.5 FL (ref 9.2–12.9)
POTASSIUM SERPL-SCNC: 5.1 MMOL/L (ref 3.5–5.1)
POTASSIUM SERPL-SCNC: 5.2 MMOL/L (ref 3.5–5.1)
PROT SERPL-MCNC: 7.4 GM/DL (ref 6–8.4)
PSA SERPL-MCNC: 0.42 NG/ML
RBC # BLD AUTO: 4.72 M/UL (ref 4.6–6.2)
SODIUM SERPL-SCNC: 142 MMOL/L (ref 136–145)
SODIUM SERPL-SCNC: 143 MMOL/L (ref 136–145)
T4 FREE SERPL-MCNC: 0.8 NG/DL (ref 0.71–1.51)
TRIGL SERPL-MCNC: 142 MG/DL (ref 30–150)
TSH SERPL-ACNC: 4.11 UIU/ML (ref 0.4–4)
WBC # BLD AUTO: 6.23 K/UL (ref 3.9–12.7)

## 2025-03-25 PROCEDURE — 36415 COLL VENOUS BLD VENIPUNCTURE: CPT

## 2025-03-25 PROCEDURE — 84155 ASSAY OF PROTEIN SERUM: CPT

## 2025-03-25 PROCEDURE — 71046 X-RAY EXAM CHEST 2 VIEWS: CPT | Mod: TC

## 2025-03-25 PROCEDURE — 99214 OFFICE O/P EST MOD 30 MIN: CPT | Mod: S$GLB,,, | Performed by: PHYSICIAN ASSISTANT

## 2025-03-25 PROCEDURE — 80061 LIPID PANEL: CPT

## 2025-03-25 PROCEDURE — 93005 ELECTROCARDIOGRAM TRACING: CPT | Mod: S$GLB,,, | Performed by: EMERGENCY MEDICINE

## 2025-03-25 PROCEDURE — 84153 ASSAY OF PSA TOTAL: CPT

## 2025-03-25 PROCEDURE — 84439 ASSAY OF FREE THYROXINE: CPT

## 2025-03-25 PROCEDURE — 71046 X-RAY EXAM CHEST 2 VIEWS: CPT | Mod: 26,,, | Performed by: RADIOLOGY

## 2025-03-25 PROCEDURE — 99999 PR PBB SHADOW E&M-EST. PATIENT-LVL III: CPT | Mod: PBBFAC,,, | Performed by: EMERGENCY MEDICINE

## 2025-03-25 PROCEDURE — 99396 PREV VISIT EST AGE 40-64: CPT | Mod: S$GLB,,, | Performed by: EMERGENCY MEDICINE

## 2025-03-25 PROCEDURE — 93010 ELECTROCARDIOGRAM REPORT: CPT | Mod: S$GLB,,, | Performed by: INTERNAL MEDICINE

## 2025-03-25 PROCEDURE — 85027 COMPLETE CBC AUTOMATED: CPT

## 2025-03-25 PROCEDURE — 82435 ASSAY OF BLOOD CHLORIDE: CPT

## 2025-03-25 PROCEDURE — 83036 HEMOGLOBIN GLYCOSYLATED A1C: CPT

## 2025-03-25 PROCEDURE — 84443 ASSAY THYROID STIM HORMONE: CPT

## 2025-03-25 PROCEDURE — 99999 PR PBB SHADOW E&M-EST. PATIENT-LVL I: CPT | Mod: PBBFAC,,,

## 2025-03-25 PROCEDURE — 99999 PR PBB SHADOW E&M-EST. PATIENT-LVL IV: CPT | Mod: PBBFAC,,, | Performed by: PHYSICIAN ASSISTANT

## 2025-03-25 RX ORDER — METHOCARBAMOL 750 MG/1
TABLET, FILM COATED ORAL
COMMUNITY

## 2025-03-25 RX ORDER — AMLODIPINE BESYLATE 10 MG/1
TABLET ORAL DAILY
COMMUNITY

## 2025-03-25 RX ORDER — EZETIMIBE 10 MG/1
10 TABLET ORAL DAILY
Qty: 90 TABLET | Refills: 3 | Status: SHIPPED | OUTPATIENT
Start: 2025-03-25 | End: 2026-03-25

## 2025-03-25 RX ORDER — CLOTRIMAZOLE AND BETAMETHASONE DIPROPIONATE 10; .64 MG/G; MG/G
CREAM TOPICAL
COMMUNITY

## 2025-03-25 NOTE — PROGRESS NOTES
"        [facility]      TODAY'S Date 3/25/2025  Patient ID: Yoel Martinez is a 64 y.o. male   MRN: [id number]  Primary Care Physician (PCP):  No, Primary Doctor    SUBJECTIVE     Chief Complaint:   Chief Complaint   Patient presents with    [facility]     Has been well, some swelling of ankles, "rash" on ankles, saw his Dr.. Bakari alfaro disease      HISTORY OF PRESENT ILLNESS (HPI):   Reviewed medical, surgical, social and family history, medications, appropriate preventive health screenings, as well as vaccination history. Updates as noted below or in assessment and plan.    This is a very pleasant 64 y.o. nonsmoking male with PMHx coronary artery disease status post stent, degenerative joint disease, macular degeneration with legal blindness, hypertension, subclinical hypothyroidism. Although presenting for his usual annual exam in [facility], he is a new patient to me.  The patient is currently in good health, with the exception of back pain.    MUSCULOSKELETAL:  He reports back pain triggered by bending, yard work, and washing vehicle, managed with extra strength Tylenol. He has bilateral leg numbness below a certain point for the past couple years, described as mild and stable without progression. He has a history of meniscus surgery which has limited his ability to run.    CARDIOVASCULAR:  He has bilateral ankle swelling that began in the last year. Episodes include swollen and sweaty ankles that typically resolve within 24 hours, with maximum duration of 3 days. He has a history of cardiac stents placed in 2014. He started blood pressure medication 2 years ago, initially at 5 mg with subsequent dose increase to 10 mg.    DERMATOLOGIC:  He reports a second occurrence of a rash at the sock line without associated pain, itching, or discomfort. Previous episode resolved completely over one week with topical steroid cream applied once to twice daily. He recently " applied the remaining cream once to the current rash.    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 He is pre-diabetic. He had a calcium deposit removed from a bone in sixth grade.    ENT:  He has longstanding hearing difficulties attributed to occupational exposure to explosions. He exhibits difficulty with conversation, frequently asking others to repeat themselves. He has not had hearing evaluation in many years and has not pursued hearing aids.    SOCIAL HISTORY:  He exercises at gym 3-4 times per week. He reports regular beer consumption. He is a non-smoker but reports significant history of secondhand smoke exposure from parents.      ROS:  General: -fever, -chills, -fatigue, -weight gain, -weight loss  Eyes: -vision changes, -redness, -discharge, +loss of vision  ENT: -ear pain, -nasal congestion, -sore throat, +hearing loss, +difficulty hearing  Cardiovascular: -chest pain, -palpitations, +lower extremity edema  Respiratory: -cough, -shortness of breath  Gastrointestinal: -abdominal pain, -nausea, -vomiting, -diarrhea, -constipation, -blood in stool  Genitourinary: -dysuria, -hematuria, -frequency  Musculoskeletal: -joint pain, -muscle pain, +limb swelling, +joint swelling  Skin: +rash, -lesion  Neurological: -headache, -dizziness, +numbness, -tingling, +lightheadedness  Psychiatric: -anxiety, -depression, -sleep difficulty       A review of medical records indicates patient has seen the following specialists:   Cardiology - Vonnie Liriano, PHAM  Optometry - DONY Maldonado Jr. OD      Immunization History   Administered Date(s) Administered    Influenza - Quadrivalent 10/01/2015    Influenza - Quadrivalent - PF *Preferred* (6 months and older) 10/06/2014, 01/22/2020, 02/05/2021    Tdap 09/25/2015    Zoster Recombinant 01/22/2020, 10/13/2020     Past Medical History:   Diagnosis Date    Coronary artery disease     Stents 2014.    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    Left thigh tumor      Excision of calcifications.    REPAIR OF MENISCUS OF KNEE Right     TONSILLECTOMY       Family History   Problem Relation Name Age of Onset    Heart disease Mother      Heart attack Mother      Diabetes Mother      Hyperlipidemia Mother      Heart disease Father      COPD Father      Heart attack Father      Hyperlipidemia Father      Heart attack Maternal Grandmother      Heart attack Maternal Grandfather      Heart attack Paternal Grandmother      Heart attack Paternal Grandfather      Hypertension Sister 5     Colon cancer Neg Hx       Social History[1]  Past Medical, Surgical and Social history reviewed and verified by me.       Review of patient's allergies indicates:   Allergen Reactions    Penicillins Rash     Medications Ordered Prior to Encounter[2]    OBJECTIVE     PHYSICAL EXAM  Vitals:    03/25/25 1011   BP: 132/84   BP Location: Left arm   Patient Position: Sitting   Pulse: (!) 49   SpO2: 98%   Weight: 84.4 kg (186 lb)   Height: 5' 7" (1.702 m)     Vital Signs (Most Recent):  Pulse: (!) 49 (03/25/25 1011)  BP: 132/84 (03/25/25 1011)  SpO2: 98 % (03/25/25 1011)   Weight:   Wt Readings from Last 1 Encounters:   03/25/25 87.3 kg (192 lb 7.4 oz)     Body mass index is 29.13 kg/m².        Vital signs and nursing assessment noted:  Bradycardia    GEN:   NAD, A & Ox3, atraumatic, well appearing, nontoxic appearing, overweight  HEENT:  PERRLA, EOMI, moist membranes, nl conjunctiva, no scleral icterus, no nystagmus, no nodes/nodules, soft, supple, FROM, no trachial " deviation, nexus negative, normal TMs bilaterally, normal pharynx  CV:   Bradycardia, regular rhythm, no m/r/g, 2+ radial pulses, <2sec cap refill, no obvious JVD  RESP:  CTA B, no w/r/r, equal and bilateral chest rise, no respiratory distress  ABD:   soft, Nontender, Nondistended, +BS, no guarding/rebound  :   Deferred  BACK:  FROM, no midline tenderness, no paraspinal tenderness  EXT:   FROM, SANTIZO x 4, no swelling, no edema, no calf tenderness, no bony tenderness, no warmth or redness, no crepitus, no obvious deformity  LYMPH:  no gross adenopathy  NEURO:  GCS 15, CN II-XII grossly intact, no obvious motor/sensory deficit, no tremor, negative Romberg,  nl gait/coordination  PSYCH:   Cooperative, no SI/HI, no anxiety, nl mood/affect, nl judgement/thought process  SKIN:  Mole and macule with surgical scar noted in picture otherwise no rashes/lesions or masses, nl color, no pallor, warm, dry, intact                            Tests      Results for orders placed or performed during the hospital encounter of 03/25/25   EKG 12-lead    Collection Time: 03/25/25  9:49 AM   Result Value Ref Range    QRS Duration 98 ms    [facility] QTC Calculation 426 ms    Narrative    Test Reason : R06.00,    Vent. Rate :  49 BPM     Atrial Rate :  49 BPM     P-R Int : 138 ms          QRS Dur :  98 ms      QT Int : 472 ms       P-R-T Axes :  44  19  39 degrees    QTcB Int : 426 ms    Sinus bradycardia  Otherwise normal ECG  When compared with ECG of 20-Mar-2024 09:25,  No significant change was found  Confirmed by Luis Miramontes ([id number]) on 3/25/2025 10:18:24 AM    Referred By: EVER MERAZ           Confirmed By: Luis Miramontes      Labs reviewed and independently interpreted. Imaging studies reviewed.   Recent Results (from the past 12 weeks)   Comprehensive metabolic panel    Collection Time: 03/25/25  9:29 AM   Result Value Ref Range    Sodium 142 136 - 145 mmol/L    Potassium 5.2 (H) 3.5 - 5.1 mmol/L    Chloride 106 95 - 110 mmol/L    CO2 27  23 - 29 mmol/L    Glucose 115 (H) 70 - 110 mg/dL    BUN 14 8 - 23 mg/dL    Creatinine 0.8 0.5 - 1.4 mg/dL    Calcium 9.9 8.7 - 10.5 mg/dL    Protein Total 7.4 6.0 - 8.4 gm/dL    Albumin 4.3 3.5 - 5.2 g/dL    Bilirubin Total 1.4 (H) 0.1 - 1.0 mg/dL    ALP 60 40 - 150 unit/L    AST 29 11 - 45 unit/L    ALT 28 10 - 44 unit/L    Anion Gap 9 8 - 16 mmol/L    eGFR >60 >60 mL/min/1.73/m2   CBC Without Differential    Collection Time: 03/25/25  9:29 AM   Result Value Ref Range    WBC 6.23 3.90 - 12.70 K/uL    RBC 4.72 4.60 - 6.20 M/uL    HGB 14.4 14.0 - 18.0 gm/dL    HCT 45.9 40.0 - 54.0 %    MCV 97 82 - 98 fL    MCHC 31.4 (L) 32.0 - 36.0 g/dL    RDW 13.5 11.5 - 14.5 %    Platelet Count 230 150 - 450 K/uL    MCH 30.5 27.0 - 50.0 pg    MPV 10.5 9.2 - 12.9 fL   Lipid panel    Collection Time: 03/25/25  9:29 AM   Result Value Ref Range    Cholesterol Total 136 120 - 199 mg/dL    Triglyceride 142 30 - 150 mg/dL    HDL Cholesterol 50 40 - 75 mg/dL    LDL Cholesterol 57.6 (L) 63.0 - 159.0 mg/dL    HDL/Cholesterol Ratio 36.8 20.0 - 50.0 %    Cholesterol/HDL Ratio 2.7 2.0 - 5.0    Non HDL Cholesterol 86 mg/dL   TSH    Collection Time: 03/25/25  9:29 AM   Result Value Ref Range    TSH 4.112 (H) 0.400 - 4.000 uIU/mL   PSA, Screening (every year)    Collection Time: 03/25/25  9:29 AM   Result Value Ref Range    Prostate Specific Antigen 0.42 <=4.00 ng/mL   Hemoglobin A1c    Collection Time: 03/25/25  9:29 AM   Result Value Ref Range    Hemoglobin A1c 6.1 (H) 4.0 - 5.6 %    Estimated Average Glucose 128 68 - 131 mg/dL   T4, Free    Collection Time: 03/25/25  9:29 AM   Result Value Ref Range    Free T4 0.80 0.71 - 1.51 ng/dL   EKG 12-lead    Collection Time: 03/25/25  9:49 AM   Result Value Ref Range    QRS Duration 98 ms    [facility] QTC Calculation 426 ms   Basic Metabolic Panel    Collection Time: 03/25/25 11:39 AM   Result Value Ref Range    Sodium 143 136 - 145 mmol/L    Potassium 5.1 3.5 - 5.1 mmol/L    Chloride 105 95 - 110 mmol/L     CO2 27 23 - 29 mmol/L    Glucose 113 (H) 70 - 110 mg/dL    BUN 15 8 - 23 mg/dL    Creatinine 0.9 0.5 - 1.4 mg/dL    Calcium 9.9 8.7 - 10.5 mg/dL    Anion Gap 11 8 - 16 mmol/L    eGFR >60 >60 mL/min/1.73/m2       Latest Reference Range & Units 02/05/21 09:37 03/08/23 09:35 03/20/24 09:07 03/25/25 09:29   Hemoglobin A1C External 4.0 - 5.6 % 5.9 (H) 6.0 (H) 6.1 (H) 6.1 (H)   Estimated Avg Glucose 68 - 131 mg/dL 123 126 128 128      Latest Reference Range & Units 02/05/21 09:37 03/08/23 09:35 03/20/24 09:07 03/25/25 09:29   TSH 0.400 - 4.000 uIU/mL 4.316 (H) 3.245 3.971 4.112 (H)   Free T4 0.71 - 1.51 ng/dL 0.87   0.80       MRI LUMBAR SPINE WITHOUT CONTRAST May 2023 Impression:   1. Congenital spinal canal stenosis of the entire lumbar column.  2. Multilevel degenerative disease of the spinal canal as documented above.  3. Small annular tear about the posterior edge of the L5/S1 intervertebral disc accounted by a small hyperintense focal focus along the inferior margin.  Electronically signed by:Jay Yee MD    XR CHEST PA AND LATERAL Jan 2022 Impression: No acute intrathoracic abnormality.         ASSESSMENT/PLAN:   MDM  Reviewed: previous chart, nursing note and vitals  Reviewed previous: labs, ECG, x-ray and MRI  Interpretation: labs (elevated hemoglobin A1c, potassium, TSH with normal free T4 and low LDL otherwise relatively unremarkable Lipid Panel, CMP, CBC, TSH, PSA)       Health Maintenance   Topic Date Due    Pneumococcal Vaccines (Age 50+) (1 of 1 - PCV) Never done    RSV Vaccine (Age 60+ and Pregnant patients) (1 - Risk 60-74 years 1-dose series) Never done    Colorectal Cancer Screening  10/01/2022    Influenza Vaccine (1) 09/01/2024    COVID-19 Vaccine (4 - 2024-25 season) 09/01/2024    TETANUS VACCINE  09/25/2025    High Dose Statin  03/25/2026    Aspirin/Antiplatelet Therapy  03/25/2026    Hemoglobin A1c (Prediabetes)  03/25/2026    Hepatitis C Screening  Completed    Shingles Vaccine  Completed     HIV Screening  Completed     Yoel was seen today for [facility].    Diagnoses and all orders for this visit:    Encounter for screening and preventative care  -     Cologuard Screening (Multitarget Stool DNA); Future  -     Cologuard Screening (Multitarget Stool DNA)    Screening for colon cancer  -     Cologuard Screening (Multitarget Stool DNA); Future  -     Cologuard Screening (Multitarget Stool DNA)    At high risk for hyperkalemia  -     Basic Metabolic Panel; Future    Prediabetes    Subclinical hypothyroidism    Hyperbilirubinemia    Alcohol use    Sinus bradycardia on ECG     Assessment & Plan      IMPRESSION:  - Ankle swelling in past year, potentially related to recent initiation of BP medication.  - Rash on leg, likely related to contact with boots worn during yard work.  - Potential bilateral hearing loss.  - Numbness in thigh, likely related to previous surgical intervention for calcium deposit.  - Lab results:  - Mildly elevated potassium (5.2, normal <5.1).  - Elevated total bilirubin.  - Cholesterol levels within target range.  - Hemoglobin A1c at 6.1, indicating prediabetes risk.  - Elevated TSH, suggesting possible hypothyroidism.  - Low heart rate, deferring to cardiologist for further evaluation.  - Normal chest XR from 2022.    - Discussed relationship between alcohol consumption and leg swelling, elevated blood sugar, and numbness.  - Explained importance of maintaining cholesterol levels and ways to increase HDL.  - Educated on pre-diabetic state indicated by hemoglobin A1c level.  - Discussed potential hypothyroidism based on elevated TSH.    - Mr. Martinez to drink a full bottle of water before repeating labs.  - Recommend decreasing alcohol consumption to address elevated total bilirubin.    - Ordered repeat BMP after hydration.  - Ordered Free T4 to evaluate thyroid function.   For asymptomatic patients with sinus bradycardia, treatment is neither indicated nor required.         The results of physical exam findings, labs, and imaging were reviewed with the patient. Management of above assessments/visit diagnoses was discussed with patient. Precautions for return discussed at length. Patient was given ample time for questions. All questions asked and answered to the satisfaction of the patient. Patient is in agreement with the above and verbalized understanding. Total time spent caring for the patient today was 30 minutes. This includes time spent before the visit reviewing the chart, time spent during the visit, and time spent after the visit on documentation.    Lora Vickers MD  [facility]    Disclaimer: This document was created using voice recognition software ([employer]). Although it may be edited, this document may contain errors related to incorrect recognition of the spoken word. Please contact the physician if clarification is needed.         [1]   Social History  Tobacco Use    Smoking status: Never     Passive exposure: Yes    Smokeless tobacco: Never    Tobacco comments:     The patient is disabled secondary to his legal blindness.  He does exercise readily about 3 times per week.   Substance Use Topics    Alcohol use: Yes     Comment: Most days. 1 to 2 beers most days, sometimes 6 if social.    Drug use: No   [2]   Current Outpatient Medications on File Prior to Visit   Medication Sig Dispense Refill    amLODIPine (NORVASC) 10 MG tablet Take 1 tablet (10 mg total) by mouth once daily. 90 tablet 3    amLODIPine (NORVASC) 10 MG tablet Take by mouth once daily.      aspirin (ECOTRIN) 81 MG EC tablet Take 81 mg by mouth once daily.      cholecalciferol, vitamin D3, (VITAMIN D3) 50 mcg (2,000 unit) Cap Take 5,000 Units by mouth once daily.      clotrimazole-betamethasone 1-0.05% (LOTRISONE) cream PLEASE SEE ATTACHED FOR DETAILED DIRECTIONS      ezetimibe (ZETIA) 10 mg tablet Take 1 tablet (10 mg total) by mouth once  daily. 90 tablet 3    methocarbamoL (ROBAXIN) 750 MG Tab TAKE 1 OR 2 TABLETS BY MOUTH 3 TIMES A DAY AS NEEDED      omega-3 fatty acids-vitamin E 1,000 mg Cap Take 2,400 mg by mouth 2 (two) times a day.      rosuvastatin (CRESTOR) 40 MG Tab Take 1 tablet (40 mg total) by mouth every evening. 90 tablet 3    saw palmetto 160 MG capsule Take 450 mg by mouth once daily.        No current facility-administered medications on file prior to visit.

## 2025-03-25 NOTE — PROGRESS NOTES
General Cardiology Clinic Note  Reason for Visit: Annual follow up  Last Clinic Visit: 3/20/2024  General Cardiologist: Dr. Rodríguez     HPI:   Yoel Martinez is a 64 y.o. male who presents for annual follow up.     Problems:  CAD s/p PCI/JACKSON to pLAD and mRCA 11/2014  Hypertension  Dyslipidemia  Pre-diabetes    Interval HPI:   Patient presents for annual follow up. If he eats too much seafood and beer, he will get some ankle swelling. Otherwise, doesn't usually have any edema. He also gets an intermittent rash on his ankles that resolves with steroid cream. He denies CP, VENCES, orthopnea, PND, syncope, near syncope, lightheadedness, palpitations, TIA. He exercises 3-4 days a week, for 3 hours at a time. He does have chronic back and knee pain, which prevents him from running. He was diagnosed with CAD after a screening treadmill EKG was positive for ischemia, despite excellent exercise capacity and no symptoms.     3/20/2024 HPI  Patient presents for annual follow up. Feels great. He denies symptoms of CAD, CHF, arrhythmia, hypotension, TIA, claudication. Exercises 4 days a week at the gym. Only complaint continues to be of chronic back pain. He did switch from Ibuprofen to Tylenol to control the pain. Not checking BP at home.     3/8/2023 HPI  Patient presents for annual follow up. He denies symptoms of CAD, CHF, arrhythmia, hypotension, TIA, claudication. He exercises at least three times a week at the gym; he uses the treadmill or elliptical. He has chronic muscle cramps for many years; he plans to start using CoQ10. He also reports chronic back pain for which he has started taking Ibuprofen regularly. He was advised by his PCP today to switch to Tylenol instead.     2/5/2021 HPI (me)  Patient with asymptomatic (diagnosed by ETT, but may have had some exertional dyspnea) coronary artery disease s/p PCI on 24-Nov-2014 with drug eluting with stents placed to proximal LAD and mid RCA presents for one year follow  up.       Patient denies any chest discomfort on exertion or at rest. Patient denies any dyspnea at rest or on exertion, orthopnea, PND, or edema.  Patient denies any palpitations, lightheadedness, or syncope. He goes to the gym at least 4 days a week for about 2.5 hours. He had his meniscus surgery nearly a year ago, but has only been back jogging for a couple months and does not think he will ever be able to run like he did before the surgery. He will also use the elliptical and do strengthening exercises. He admits that his diet is not heart healthy; he enjoys classic high sodium cajun food and drinks beer.     ROS:      Review of Systems   Constitutional: Negative for diaphoresis, malaise/fatigue, weight gain and weight loss.   HENT:  Negative for nosebleeds.    Eyes:  Negative for vision loss in left eye, vision loss in right eye and visual disturbance.   Cardiovascular:  Positive for leg swelling. Negative for chest pain, claudication, dyspnea on exertion, irregular heartbeat, near-syncope, orthopnea, palpitations, paroxysmal nocturnal dyspnea and syncope.   Respiratory:  Negative for cough, shortness of breath, sleep disturbances due to breathing, snoring and wheezing.    Hematologic/Lymphatic: Negative for bleeding problem. Does not bruise/bleed easily.   Skin:  Negative for poor wound healing and rash.   Musculoskeletal:  Positive for back pain and joint pain. Negative for muscle cramps and myalgias.   Gastrointestinal:  Negative for bloating, abdominal pain, diarrhea, heartburn, melena, nausea and vomiting.   Genitourinary:  Negative for hematuria and nocturia.   Neurological:  Negative for brief paralysis, dizziness, headaches, light-headedness, numbness and weakness.   Psychiatric/Behavioral:  Negative for depression.    Allergic/Immunologic: Negative for hives.       PMH:     Past Medical History:   Diagnosis Date    Coronary artery disease     Stents 2014.    Degenerative joint disease (DJD) of lumbar  spine     Hyperlipidemia     Macular degeneration     Legally blind    Muscle cramps     Prediabetes     Primary hypertension 03/08/2023    Stargardt's disease     Subclinical hypothyroidism      Past Surgical History:   Procedure Laterality Date    CARDIAC CATHETERIZATION  11/25/14    Successful 2vs JACKSON for LAD and RCA disease    COLONOSCOPY      Normal at age 52 with plan for 10 yr f/u.    Left thigh tumor      Excision of calcifications.    REPAIR OF MENISCUS OF KNEE Right     TONSILLECTOMY       Allergies:     Review of patient's allergies indicates:   Allergen Reactions    Penicillins Rash     Medications:     Current Outpatient Medications on File Prior to Visit   Medication Sig Dispense Refill    amLODIPine (NORVASC) 10 MG tablet Take 1 tablet (10 mg total) by mouth once daily. 90 tablet 3    aspirin (ECOTRIN) 81 MG EC tablet Take 81 mg by mouth once daily.      cholecalciferol, vitamin D3, (VITAMIN D3) 50 mcg (2,000 unit) Cap Take 5,000 Units by mouth once daily.      ezetimibe (ZETIA) 10 mg tablet Take 1 tablet (10 mg total) by mouth once daily. 90 tablet 3    omega-3 fatty acids-vitamin E 1,000 mg Cap Take 2,400 mg by mouth 2 (two) times a day.      potassium 99 mg Tab Take by mouth once daily.      rosuvastatin (CRESTOR) 40 MG Tab Take 1 tablet (40 mg total) by mouth every evening. 90 tablet 3    saw palmetto 160 MG capsule Take 450 mg by mouth once daily.        No current facility-administered medications on file prior to visit.     Social History:     Social History     Tobacco Use    Smoking status: Never     Passive exposure: Yes    Smokeless tobacco: Never    Tobacco comments:     The patient is disabled secondary to his legal blindness.  He does exercise readily about 3 times per week.   Substance Use Topics    Alcohol use: Yes     Comment: Most days. 1 to 2 beers most days, sometimes 6 if social.     Family History:     Family History   Problem Relation Name Age of Onset    Heart disease Mother    "   Heart attack Mother      Diabetes Mother      Hyperlipidemia Mother      Heart disease Father      COPD Father      Heart attack Father      Hyperlipidemia Father      Heart attack Maternal Grandmother      Heart attack Maternal Grandfather      Heart attack Paternal Grandmother      Heart attack Paternal Grandfather      Hypertension Sister 5     Colon cancer Neg Hx       Physical Exam:   /72 (Patient Position: Sitting)   Pulse (!) 57   Ht 5' 7" (1.702 m)   Wt 87.3 kg (192 lb 7.4 oz)   SpO2 (!) 94%   BMI 30.14 kg/m²        Physical Exam  Vitals and nursing note reviewed.   Constitutional:       General: He is not in acute distress.     Appearance: Normal appearance.   HENT:      Head: Normocephalic and atraumatic.      Nose: Nose normal.   Eyes:      General: No scleral icterus.     Extraocular Movements: Extraocular movements intact.      Conjunctiva/sclera: Conjunctivae normal.   Neck:      Thyroid: No thyromegaly.      Vascular: No carotid bruit or JVD.   Cardiovascular:      Rate and Rhythm: Regular rhythm. Bradycardia present.      Pulses: Normal pulses.           Radial pulses are 2+ on the right side and 2+ on the left side.        Posterior tibial pulses are 2+ on the right side and 2+ on the left side.      Heart sounds: Normal heart sounds. No murmur heard.     No friction rub. No gallop.   Pulmonary:      Effort: Pulmonary effort is normal.      Breath sounds: Normal breath sounds. No wheezing, rhonchi or rales.   Chest:      Chest wall: No tenderness.   Abdominal:      General: Bowel sounds are normal. There is no distension.      Palpations: Abdomen is soft.      Tenderness: There is no abdominal tenderness.   Musculoskeletal:      Cervical back: Neck supple.      Right lower leg: No edema.      Left lower leg: No edema.   Skin:     General: Skin is warm and dry.      Coloration: Skin is not pale.      Findings: No erythema or rash.      Nails: There is no clubbing.   Neurological:      " "Mental Status: He is alert and oriented to person, place, and time.   Psychiatric:         Mood and Affect: Mood and affect normal.         Behavior: Behavior normal.          Labs:     Lab Results   Component Value Date     03/25/2025     03/20/2024    K 5.2 (H) 03/25/2025    K 4.6 03/20/2024     03/25/2025     03/20/2024    CO2 27 03/25/2025    CO2 27 03/20/2024    BUN 14 03/25/2025    CREATININE 0.8 03/25/2025    GLUCOSE 115 (H) 03/25/2025    ANIONGAP 9 03/25/2025     Lab Results   Component Value Date    HGBA1C 6.1 (H) 03/20/2024     No results found for: "BNP", "BNPTRIAGEBLO" Lab Results   Component Value Date    WBC 6.23 03/25/2025    HGB 14.4 03/25/2025    HGB 14.2 03/20/2024    HCT 45.9 03/25/2025    HCT 45.3 03/20/2024     03/25/2025     03/20/2024    GRAN 2.9 01/22/2020    GRAN 60.3 01/22/2020     Lab Results   Component Value Date    CHOL 136 03/25/2025    CHOL 140 03/20/2024    HDL 50 03/25/2025    LDLCALC 74.8 03/20/2024    TRIG 142 03/25/2025    TRIG 111 03/20/2024          Imaging:   Treadmill stress test 11/1/2016    1. The EKG portion of this study is negative for ischemia at a high workload, and peak heart rate of 142 bpm (86% of predicted).   2. Exercise capacity is excellent.   3. Blood pressure response to exercise was normal (Presenting BP: 134/63 Peak BP: 210/79).   4. No significant arrhythmias were present.   5. There were no symptoms of chest discomfort or significant dyspnea throughout the protocol.   6. The Duke treadmill score was 11 suggesting a low probability for future cardiovascular events.     [facility] 2014  The proximal LAD has a 80% stenosis. The lesion was successfully intervened. The following items were used: Stent Xience Rx 3.0x8 (JACKSON).  The LCX is normal  The mid RCA has a 90% stenosis. 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 The lesion was successfully intervened. The following items were used: Stent Xience " Rx 2.75x18 (JACKSON).    EKG 1/22/2020: Sinus bradycardia, HR 41, otherwise normal     EKG 2/5/2021: Sinus bradycardia, HR 46, otherwise normal     Assessment:     1. Atherosclerosis of native coronary artery of native heart without angina pectoris    2. S/P PTCA (percutaneous transluminal coronary angioplasty)    3. Dyslipidemia    4. Primary hypertension      Plan:     CAD s/p PCI to pLAD and RCA  Stable. He was asymptomatic prior to PCI. He had ischemic changes on screening exercise EKG. It has been nearly 10 years since he has had any ischemic eval. Will obtain stress echo prior to his next appt.   Continue medical management with ASA, high intensity statin, and Zetia  Continue aerobic exercise with a goal of at least 30 minutes, 5 days a week.    Dyslipidemia  LDL at goal  Continue Crestor 40 mg   Continue Zetia 10 mg daily   Mediterranean diet     Hypertension  Controlled  Continue Amlodipine 10 mg daily   Start home BP log       Follow up in one year with exercise stress echo prior.     Signed:  Vonnie Liriano PA-C  Cardiology   [phone] - Interventional  [phone] - General  3/25/2025 9:16 AM

## 2025-06-25 ENCOUNTER — PATIENT MESSAGE (OUTPATIENT)
Dept: CARDIOLOGY | Facility: CLINIC | Age: 65
End: 2025-06-25
Payer: MEDICARE

## 2025-06-25 DIAGNOSIS — I10 PRIMARY HYPERTENSION: Primary | ICD-10-CM

## 2025-06-25 RX ORDER — LOSARTAN POTASSIUM AND HYDROCHLOROTHIAZIDE 12.5; 5 MG/1; MG/1
1 TABLET ORAL DAILY
Qty: 90 TABLET | Refills: 3 | Status: SHIPPED | OUTPATIENT
Start: 2025-06-25 | End: 2026-06-25

## 2025-06-25 NOTE — TELEPHONE ENCOUNTER
Pt called me back. He said that it might take up to 10 days to get the new med. I encourage taking the amlodipine until gets the new med. He lives far and wants to do the blood work w. PCP. I emailed the order to his wife and messaged him w. name of the test / fax number. He verbalized understanding.

## 2025-07-12 ENCOUNTER — LAB VISIT (OUTPATIENT)
Dept: LAB | Facility: HOSPITAL | Age: 65
End: 2025-07-12
Attending: PHYSICIAN ASSISTANT
Payer: MEDICARE

## 2025-07-12 DIAGNOSIS — I10 BENIGN HYPERTENSION: Primary | ICD-10-CM

## 2025-07-12 LAB
ANION GAP (OHS): 11 MMOL/L (ref 8–16)
BUN SERPL-MCNC: 17 MG/DL (ref 8–23)
CALCIUM SERPL-MCNC: 9.6 MG/DL (ref 8.7–10.5)
CHLORIDE SERPL-SCNC: 100 MMOL/L (ref 95–110)
CO2 SERPL-SCNC: 26 MMOL/L (ref 23–29)
CREAT SERPL-MCNC: 1.1 MG/DL (ref 0.5–1.4)
GFR SERPLBLD CREATININE-BSD FMLA CKD-EPI: >60 ML/MIN/1.73/M2
GLUCOSE SERPL-MCNC: 107 MG/DL (ref 70–110)
POTASSIUM SERPL-SCNC: 4.1 MMOL/L (ref 3.5–5.1)
SODIUM SERPL-SCNC: 137 MMOL/L (ref 136–145)

## 2025-07-12 PROCEDURE — 80048 BASIC METABOLIC PNL TOTAL CA: CPT

## 2025-07-12 PROCEDURE — 36415 COLL VENOUS BLD VENIPUNCTURE: CPT
